# Patient Record
Sex: FEMALE | Race: BLACK OR AFRICAN AMERICAN | NOT HISPANIC OR LATINO | Employment: UNEMPLOYED | URBAN - METROPOLITAN AREA
[De-identification: names, ages, dates, MRNs, and addresses within clinical notes are randomized per-mention and may not be internally consistent; named-entity substitution may affect disease eponyms.]

---

## 2017-06-14 ENCOUNTER — HOSPITAL ENCOUNTER (INPATIENT)
Facility: HOSPITAL | Age: 37
LOS: 2 days | Discharge: HOME | DRG: 392 | End: 2017-06-16
Attending: EMERGENCY MEDICINE | Admitting: INTERNAL MEDICINE
Payer: COMMERCIAL

## 2017-06-14 ENCOUNTER — APPOINTMENT (EMERGENCY)
Dept: CT IMAGING | Facility: HOSPITAL | Age: 37
DRG: 392 | End: 2017-06-14
Payer: COMMERCIAL

## 2017-06-14 DIAGNOSIS — R69 DIAGNOSIS UNKNOWN: ICD-10-CM

## 2017-06-14 DIAGNOSIS — F11.90 CHRONIC, CONTINUOUS USE OF OPIOIDS: ICD-10-CM

## 2017-06-14 DIAGNOSIS — R10.31 ACUTE BILATERAL LOWER ABDOMINAL PAIN: ICD-10-CM

## 2017-06-14 DIAGNOSIS — F32.A ANXIETY AND DEPRESSION: ICD-10-CM

## 2017-06-14 DIAGNOSIS — R10.32 ACUTE BILATERAL LOWER ABDOMINAL PAIN: ICD-10-CM

## 2017-06-14 DIAGNOSIS — F41.9 ANXIETY AND DEPRESSION: ICD-10-CM

## 2017-06-14 DIAGNOSIS — N82.4 ENTEROVAGINAL FISTULA: ICD-10-CM

## 2017-06-14 DIAGNOSIS — R10.9 ABDOMINAL PAIN, ACUTE: Primary | ICD-10-CM

## 2017-06-14 PROBLEM — K50.90 CROHN'S DISEASE (HCC): Chronic | Status: ACTIVE | Noted: 2017-06-14

## 2017-06-14 LAB
ALBUMIN SERPL BCP-MCNC: 3.3 G/DL (ref 3.5–5)
ALP SERPL-CCNC: 91 U/L (ref 46–116)
ALT SERPL W P-5'-P-CCNC: 31 U/L (ref 12–78)
ANION GAP SERPL CALCULATED.3IONS-SCNC: 8 MMOL/L (ref 4–13)
AST SERPL W P-5'-P-CCNC: 23 U/L (ref 5–45)
BASOPHILS # BLD AUTO: 0.02 THOUSANDS/ΜL (ref 0–0.1)
BASOPHILS NFR BLD AUTO: 0 % (ref 0–1)
BILIRUB SERPL-MCNC: 0.2 MG/DL (ref 0.2–1)
BUN SERPL-MCNC: 12 MG/DL (ref 5–25)
CALCIUM SERPL-MCNC: 8.5 MG/DL (ref 8.3–10.1)
CHLORIDE SERPL-SCNC: 108 MMOL/L (ref 100–108)
CLARITY, POC: CLEAR
CO2 SERPL-SCNC: 27 MMOL/L (ref 21–32)
COLOR, POC: NORMAL
CREAT SERPL-MCNC: 0.81 MG/DL (ref 0.6–1.3)
EOSINOPHIL # BLD AUTO: 0.29 THOUSAND/ΜL (ref 0–0.61)
EOSINOPHIL NFR BLD AUTO: 4 % (ref 0–6)
ERYTHROCYTE [DISTWIDTH] IN BLOOD BY AUTOMATED COUNT: 15 % (ref 11.6–15.1)
EXT BILIRUBIN, UA: NEGATIVE
EXT BLOOD URINE: NEGATIVE
EXT GLUCOSE, UA: NEGATIVE
EXT KETONES: NEGATIVE
EXT NITRITE, UA: NEGATIVE
EXT PH, UA: 6
EXT PROTEIN, UA: NORMAL
EXT SPECIFIC GRAVITY, UA: 1.02
EXT UROBILINOGEN: 0.2
GFR SERPL CREATININE-BSD FRML MDRD: >60 ML/MIN/1.73SQ M
GLUCOSE SERPL-MCNC: 88 MG/DL (ref 65–140)
HCT VFR BLD AUTO: 33.9 % (ref 34.8–46.1)
HGB BLD-MCNC: 10.9 G/DL (ref 11.5–15.4)
LIPASE SERPL-CCNC: 217 U/L (ref 73–393)
LYMPHOCYTES # BLD AUTO: 2.45 THOUSANDS/ΜL (ref 0.6–4.47)
LYMPHOCYTES NFR BLD AUTO: 37 % (ref 14–44)
MCH RBC QN AUTO: 27.7 PG (ref 26.8–34.3)
MCHC RBC AUTO-ENTMCNC: 32.2 G/DL (ref 31.4–37.4)
MCV RBC AUTO: 86 FL (ref 82–98)
MONOCYTES # BLD AUTO: 0.55 THOUSAND/ΜL (ref 0.17–1.22)
MONOCYTES NFR BLD AUTO: 8 % (ref 4–12)
NEUTROPHILS # BLD AUTO: 3.41 THOUSANDS/ΜL (ref 1.85–7.62)
NEUTS SEG NFR BLD AUTO: 51 % (ref 43–75)
PLATELET # BLD AUTO: 409 THOUSANDS/UL (ref 149–390)
PMV BLD AUTO: 11.3 FL (ref 8.9–12.7)
POTASSIUM SERPL-SCNC: 3.8 MMOL/L (ref 3.5–5.3)
PROT SERPL-MCNC: 7.6 G/DL (ref 6.4–8.2)
RBC # BLD AUTO: 3.93 MILLION/UL (ref 3.81–5.12)
SODIUM SERPL-SCNC: 143 MMOL/L (ref 136–145)
WBC # BLD AUTO: 6.72 THOUSAND/UL (ref 4.31–10.16)
WBC # BLD EST: NEGATIVE 10*3/UL

## 2017-06-14 PROCEDURE — 36415 COLL VENOUS BLD VENIPUNCTURE: CPT | Performed by: EMERGENCY MEDICINE

## 2017-06-14 PROCEDURE — 81002 URINALYSIS NONAUTO W/O SCOPE: CPT | Performed by: EMERGENCY MEDICINE

## 2017-06-14 PROCEDURE — 96374 THER/PROPH/DIAG INJ IV PUSH: CPT

## 2017-06-14 PROCEDURE — 80053 COMPREHEN METABOLIC PANEL: CPT | Performed by: EMERGENCY MEDICINE

## 2017-06-14 PROCEDURE — 83690 ASSAY OF LIPASE: CPT | Performed by: EMERGENCY MEDICINE

## 2017-06-14 PROCEDURE — 99285 EMERGENCY DEPT VISIT HI MDM: CPT

## 2017-06-14 PROCEDURE — 74176 CT ABD & PELVIS W/O CONTRAST: CPT

## 2017-06-14 PROCEDURE — 96372 THER/PROPH/DIAG INJ SC/IM: CPT

## 2017-06-14 PROCEDURE — 85025 COMPLETE CBC W/AUTO DIFF WBC: CPT | Performed by: EMERGENCY MEDICINE

## 2017-06-14 RX ORDER — PANTOPRAZOLE SODIUM 40 MG/1
40 TABLET, DELAYED RELEASE ORAL DAILY
Status: DISCONTINUED | OUTPATIENT
Start: 2017-06-14 | End: 2017-06-16 | Stop reason: HOSPADM

## 2017-06-14 RX ORDER — PRAZOSIN HYDROCHLORIDE 1 MG/1
2 CAPSULE ORAL
Status: DISCONTINUED | OUTPATIENT
Start: 2017-06-14 | End: 2017-06-16 | Stop reason: HOSPADM

## 2017-06-14 RX ORDER — KETOROLAC TROMETHAMINE 30 MG/ML
15 INJECTION, SOLUTION INTRAMUSCULAR; INTRAVENOUS EVERY 6 HOURS PRN
Status: DISCONTINUED | OUTPATIENT
Start: 2017-06-14 | End: 2017-06-14

## 2017-06-14 RX ORDER — PRAZOSIN HYDROCHLORIDE 2 MG/1
2 CAPSULE ORAL
COMMUNITY
End: 2017-12-27

## 2017-06-14 RX ORDER — ONDANSETRON 2 MG/ML
4 INJECTION INTRAMUSCULAR; INTRAVENOUS EVERY 4 HOURS PRN
Status: DISCONTINUED | OUTPATIENT
Start: 2017-06-14 | End: 2017-06-16 | Stop reason: HOSPADM

## 2017-06-14 RX ORDER — MORPHINE SULFATE 15 MG/1
30 TABLET ORAL EVERY 6 HOURS PRN
Status: DISCONTINUED | OUTPATIENT
Start: 2017-06-14 | End: 2017-06-16 | Stop reason: HOSPADM

## 2017-06-14 RX ORDER — HYDROMORPHONE HCL 110MG/55ML
2 PATIENT CONTROLLED ANALGESIA SYRINGE INTRAVENOUS ONCE
Status: COMPLETED | OUTPATIENT
Start: 2017-06-14 | End: 2017-06-14

## 2017-06-14 RX ORDER — SODIUM CHLORIDE 9 MG/ML
125 INJECTION, SOLUTION INTRAVENOUS CONTINUOUS
Status: DISCONTINUED | OUTPATIENT
Start: 2017-06-14 | End: 2017-06-16

## 2017-06-14 RX ORDER — MORPHINE SULFATE 15 MG/1
30 TABLET ORAL EVERY 6 HOURS PRN
Status: DISCONTINUED | OUTPATIENT
Start: 2017-06-14 | End: 2017-06-14

## 2017-06-14 RX ORDER — ACETAMINOPHEN 325 MG/1
650 TABLET ORAL EVERY 4 HOURS PRN
Status: DISCONTINUED | OUTPATIENT
Start: 2017-06-14 | End: 2017-06-16 | Stop reason: HOSPADM

## 2017-06-14 RX ORDER — ONDANSETRON 4 MG/1
4 TABLET, ORALLY DISINTEGRATING ORAL ONCE
Status: COMPLETED | OUTPATIENT
Start: 2017-06-14 | End: 2017-06-14

## 2017-06-14 RX ORDER — BUTALBITAL, ACETAMINOPHEN AND CAFFEINE 50; 325; 40 MG/1; MG/1; MG/1
1 TABLET ORAL AS NEEDED
COMMUNITY

## 2017-06-14 RX ORDER — AMOXICILLIN 250 MG
2 CAPSULE ORAL 2 TIMES DAILY
Status: DISCONTINUED | OUTPATIENT
Start: 2017-06-14 | End: 2017-06-16 | Stop reason: HOSPADM

## 2017-06-14 RX ORDER — MORPHINE SULFATE 15 MG/1
15 TABLET ORAL EVERY 4 HOURS PRN
Status: DISCONTINUED | OUTPATIENT
Start: 2017-06-14 | End: 2017-06-16 | Stop reason: HOSPADM

## 2017-06-14 RX ORDER — ESCITALOPRAM OXALATE 20 MG/1
20 TABLET ORAL DAILY
Status: DISCONTINUED | OUTPATIENT
Start: 2017-06-14 | End: 2017-06-16 | Stop reason: HOSPADM

## 2017-06-14 RX ORDER — ONDANSETRON HYDROCHLORIDE 4 MG/5ML
4 SOLUTION ORAL ONCE
Status: COMPLETED | OUTPATIENT
Start: 2017-06-14 | End: 2017-06-14

## 2017-06-14 RX ORDER — DEXTROSE AND SODIUM CHLORIDE 5; .9 G/100ML; G/100ML
75 INJECTION, SOLUTION INTRAVENOUS CONTINUOUS
Status: DISCONTINUED | OUTPATIENT
Start: 2017-06-14 | End: 2017-06-16

## 2017-06-14 RX ORDER — POLYETHYLENE GLYCOL 3350 17 G/17G
17 POWDER, FOR SOLUTION ORAL DAILY
Status: DISCONTINUED | OUTPATIENT
Start: 2017-06-14 | End: 2017-06-16 | Stop reason: HOSPADM

## 2017-06-14 RX ORDER — MAGNESIUM HYDROXIDE/ALUMINUM HYDROXICE/SIMETHICONE 120; 1200; 1200 MG/30ML; MG/30ML; MG/30ML
30 SUSPENSION ORAL EVERY 6 HOURS PRN
Status: DISCONTINUED | OUTPATIENT
Start: 2017-06-14 | End: 2017-06-16 | Stop reason: HOSPADM

## 2017-06-14 RX ORDER — BUSPIRONE HYDROCHLORIDE 5 MG/1
10 TABLET ORAL 3 TIMES DAILY
Status: DISCONTINUED | OUTPATIENT
Start: 2017-06-14 | End: 2017-06-16 | Stop reason: HOSPADM

## 2017-06-14 RX ORDER — QUETIAPINE FUMARATE 100 MG/1
100 TABLET, FILM COATED ORAL
COMMUNITY
End: 2017-12-27

## 2017-06-14 RX ORDER — QUETIAPINE FUMARATE 100 MG/1
100 TABLET, FILM COATED ORAL
Status: DISCONTINUED | OUTPATIENT
Start: 2017-06-14 | End: 2017-06-16 | Stop reason: HOSPADM

## 2017-06-14 RX ORDER — ESCITALOPRAM OXALATE 20 MG/1
20 TABLET ORAL DAILY
COMMUNITY
End: 2017-10-13 | Stop reason: HOSPADM

## 2017-06-14 RX ORDER — BUSPIRONE HYDROCHLORIDE 10 MG/1
10 TABLET ORAL 3 TIMES DAILY
COMMUNITY
End: 2017-10-13 | Stop reason: HOSPADM

## 2017-06-14 RX ORDER — SERTRALINE HYDROCHLORIDE 100 MG/1
100 TABLET, FILM COATED ORAL DAILY
Status: DISCONTINUED | OUTPATIENT
Start: 2017-06-14 | End: 2017-06-14

## 2017-06-14 RX ORDER — PREGABALIN 75 MG/1
150 CAPSULE ORAL 2 TIMES DAILY
Status: DISCONTINUED | OUTPATIENT
Start: 2017-06-14 | End: 2017-06-16 | Stop reason: HOSPADM

## 2017-06-14 RX ORDER — AMLODIPINE BESYLATE 5 MG/1
5 TABLET ORAL DAILY
Status: DISCONTINUED | OUTPATIENT
Start: 2017-06-14 | End: 2017-06-16 | Stop reason: HOSPADM

## 2017-06-14 RX ORDER — SERTRALINE HYDROCHLORIDE 100 MG/1
100 TABLET, FILM COATED ORAL DAILY
COMMUNITY
End: 2017-10-13 | Stop reason: HOSPADM

## 2017-06-14 RX ORDER — AMLODIPINE BESYLATE 5 MG/1
10 TABLET ORAL DAILY
COMMUNITY

## 2017-06-14 RX ORDER — TRAZODONE HYDROCHLORIDE 150 MG/1
150 TABLET ORAL
COMMUNITY
End: 2017-12-27

## 2017-06-14 RX ORDER — MORPHINE SULFATE 30 MG/1
30 TABLET ORAL EVERY 6 HOURS PRN
COMMUNITY
End: 2017-10-13 | Stop reason: HOSPADM

## 2017-06-14 RX ORDER — DICYCLOMINE HCL 20 MG
20 TABLET ORAL AS NEEDED
COMMUNITY

## 2017-06-14 RX ORDER — DIPHENHYDRAMINE HYDROCHLORIDE 50 MG/ML
50 INJECTION INTRAMUSCULAR; INTRAVENOUS ONCE
Status: COMPLETED | OUTPATIENT
Start: 2017-06-14 | End: 2017-06-14

## 2017-06-14 RX ORDER — DICYCLOMINE HCL 20 MG
20 TABLET ORAL EVERY 6 HOURS
Status: DISCONTINUED | OUTPATIENT
Start: 2017-06-14 | End: 2017-06-16 | Stop reason: HOSPADM

## 2017-06-14 RX ORDER — TRAMADOL HYDROCHLORIDE 50 MG/1
100 TABLET ORAL EVERY 6 HOURS PRN
Status: DISCONTINUED | OUTPATIENT
Start: 2017-06-14 | End: 2017-06-14

## 2017-06-14 RX ADMIN — ONDANSETRON 4 MG: 4 TABLET, ORALLY DISINTEGRATING ORAL at 06:38

## 2017-06-14 RX ADMIN — HYDROMORPHONE HYDROCHLORIDE 1 MG: 1 INJECTION, SOLUTION INTRAMUSCULAR; INTRAVENOUS; SUBCUTANEOUS at 22:15

## 2017-06-14 RX ADMIN — MORPHINE SULFATE 30 MG: 15 TABLET ORAL at 18:33

## 2017-06-14 RX ADMIN — BUSPIRONE HYDROCHLORIDE 10 MG: 5 TABLET ORAL at 21:26

## 2017-06-14 RX ADMIN — HYDROMORPHONE HYDROCHLORIDE 2 MG: 2 INJECTION, SOLUTION INTRAMUSCULAR; INTRAVENOUS; SUBCUTANEOUS at 06:38

## 2017-06-14 RX ADMIN — DICYCLOMINE HYDROCHLORIDE 20 MG: 20 TABLET ORAL at 21:26

## 2017-06-14 RX ADMIN — POLYETHYLENE GLYCOL 3350 17 G: 17 POWDER, FOR SOLUTION ORAL at 16:03

## 2017-06-14 RX ADMIN — PANTOPRAZOLE SODIUM 40 MG: 40 TABLET, DELAYED RELEASE ORAL at 16:03

## 2017-06-14 RX ADMIN — MORPHINE SULFATE 30 MG: 15 TABLET ORAL at 11:07

## 2017-06-14 RX ADMIN — HYDROMORPHONE HYDROCHLORIDE 1 MG: 1 INJECTION, SOLUTION INTRAMUSCULAR; INTRAVENOUS; SUBCUTANEOUS at 04:46

## 2017-06-14 RX ADMIN — ONDANSETRON 4 MG: 2 INJECTION INTRAMUSCULAR; INTRAVENOUS at 21:26

## 2017-06-14 RX ADMIN — DICYCLOMINE HYDROCHLORIDE 20 MG: 20 TABLET ORAL at 17:28

## 2017-06-14 RX ADMIN — IOHEXOL 50 ML: 240 INJECTION, SOLUTION INTRATHECAL; INTRAVASCULAR; INTRAVENOUS; ORAL at 04:45

## 2017-06-14 RX ADMIN — AMLODIPINE BESYLATE 5 MG: 5 TABLET ORAL at 16:03

## 2017-06-14 RX ADMIN — BUSPIRONE HYDROCHLORIDE 10 MG: 5 TABLET ORAL at 16:03

## 2017-06-14 RX ADMIN — MORPHINE SULFATE 15 MG: 15 TABLET ORAL at 19:25

## 2017-06-14 RX ADMIN — HYDROMORPHONE HYDROCHLORIDE 1 MG: 1 INJECTION, SOLUTION INTRAMUSCULAR; INTRAVENOUS; SUBCUTANEOUS at 16:03

## 2017-06-14 RX ADMIN — DEXTROSE AND SODIUM CHLORIDE 75 ML/HR: 5; .9 INJECTION, SOLUTION INTRAVENOUS at 17:24

## 2017-06-14 RX ADMIN — ONDANSETRON 4 MG: 4 SOLUTION ORAL at 07:41

## 2017-06-14 RX ADMIN — DIPHENHYDRAMINE HYDROCHLORIDE 50 MG: 50 INJECTION, SOLUTION INTRAMUSCULAR; INTRAVENOUS at 04:54

## 2017-06-14 RX ADMIN — ESCITALOPRAM OXALATE 20 MG: 20 TABLET ORAL at 18:33

## 2017-06-14 RX ADMIN — PREGABALIN 150 MG: 75 CAPSULE ORAL at 17:24

## 2017-06-14 RX ADMIN — PRAZOSIN HYDROCHLORIDE 2 MG: 1 CAPSULE ORAL at 21:28

## 2017-06-14 RX ADMIN — Medication 2 TABLET: at 17:24

## 2017-06-14 RX ADMIN — QUETIAPINE FUMARATE 100 MG: 100 TABLET, FILM COATED ORAL at 21:26

## 2017-06-15 ENCOUNTER — ANESTHESIA EVENT (INPATIENT)
Dept: GASTROENTEROLOGY | Facility: HOSPITAL | Age: 37
DRG: 392 | End: 2017-06-15
Payer: COMMERCIAL

## 2017-06-15 LAB
ALBUMIN SERPL BCP-MCNC: 3 G/DL (ref 3.5–5)
ALP SERPL-CCNC: 82 U/L (ref 46–116)
ALT SERPL W P-5'-P-CCNC: 31 U/L (ref 12–78)
ANION GAP SERPL CALCULATED.3IONS-SCNC: 6 MMOL/L (ref 4–13)
AST SERPL W P-5'-P-CCNC: 19 U/L (ref 5–45)
BILIRUB SERPL-MCNC: 0.2 MG/DL (ref 0.2–1)
BILIRUB UR QL STRIP: NEGATIVE
BUN SERPL-MCNC: 7 MG/DL (ref 5–25)
CALCIUM SERPL-MCNC: 7.7 MG/DL (ref 8.3–10.1)
CHLORIDE SERPL-SCNC: 104 MMOL/L (ref 100–108)
CLARITY UR: CLEAR
CO2 SERPL-SCNC: 29 MMOL/L (ref 21–32)
COLOR UR: NORMAL
CREAT SERPL-MCNC: 0.69 MG/DL (ref 0.6–1.3)
ERYTHROCYTE [DISTWIDTH] IN BLOOD BY AUTOMATED COUNT: 15.1 % (ref 11.6–15.1)
FERRITIN SERPL-MCNC: 8 NG/ML (ref 8–388)
GFR SERPL CREATININE-BSD FRML MDRD: >60 ML/MIN/1.73SQ M
GLUCOSE SERPL-MCNC: 106 MG/DL (ref 65–140)
GLUCOSE UR STRIP-MCNC: NEGATIVE MG/DL
HCT VFR BLD AUTO: 33 % (ref 34.8–46.1)
HEMOCCULT STL QL: NEGATIVE
HGB BLD-MCNC: 10.3 G/DL (ref 11.5–15.4)
HGB UR QL STRIP.AUTO: NEGATIVE
IRON SATN MFR SERPL: 10 %
IRON SERPL-MCNC: 31 UG/DL (ref 50–170)
KETONES UR STRIP-MCNC: NEGATIVE MG/DL
LEUKOCYTE ESTERASE UR QL STRIP: NEGATIVE
MAGNESIUM SERPL-MCNC: 1.9 MG/DL (ref 1.6–2.6)
MCH RBC QN AUTO: 27.2 PG (ref 26.8–34.3)
MCHC RBC AUTO-ENTMCNC: 31.2 G/DL (ref 31.4–37.4)
MCV RBC AUTO: 87 FL (ref 82–98)
NITRITE UR QL STRIP: NEGATIVE
PH UR STRIP.AUTO: 5.5 [PH] (ref 4.5–8)
PLATELET # BLD AUTO: 359 THOUSANDS/UL (ref 149–390)
PMV BLD AUTO: 9.9 FL (ref 8.9–12.7)
POTASSIUM SERPL-SCNC: 3.5 MMOL/L (ref 3.5–5.3)
PROT SERPL-MCNC: 7 G/DL (ref 6.4–8.2)
PROT UR STRIP-MCNC: NEGATIVE MG/DL
RBC # BLD AUTO: 3.79 MILLION/UL (ref 3.81–5.12)
SODIUM SERPL-SCNC: 139 MMOL/L (ref 136–145)
SP GR UR STRIP.AUTO: 1.01 (ref 1–1.03)
TIBC SERPL-MCNC: 324 UG/DL (ref 250–450)
UROBILINOGEN UR QL STRIP.AUTO: 0.2 E.U./DL
WBC # BLD AUTO: 5.03 THOUSAND/UL (ref 4.31–10.16)

## 2017-06-15 PROCEDURE — 82728 ASSAY OF FERRITIN: CPT | Performed by: PHYSICIAN ASSISTANT

## 2017-06-15 PROCEDURE — 87046 STOOL CULTR AEROBIC BACT EA: CPT | Performed by: PHYSICIAN ASSISTANT

## 2017-06-15 PROCEDURE — 80053 COMPREHEN METABOLIC PANEL: CPT | Performed by: PHYSICIAN ASSISTANT

## 2017-06-15 PROCEDURE — 83540 ASSAY OF IRON: CPT | Performed by: PHYSICIAN ASSISTANT

## 2017-06-15 PROCEDURE — 89055 LEUKOCYTE ASSESSMENT FECAL: CPT | Performed by: PHYSICIAN ASSISTANT

## 2017-06-15 PROCEDURE — 87045 FECES CULTURE AEROBIC BACT: CPT | Performed by: PHYSICIAN ASSISTANT

## 2017-06-15 PROCEDURE — 87015 SPECIMEN INFECT AGNT CONCNTJ: CPT | Performed by: PHYSICIAN ASSISTANT

## 2017-06-15 PROCEDURE — 83735 ASSAY OF MAGNESIUM: CPT | Performed by: PHYSICIAN ASSISTANT

## 2017-06-15 PROCEDURE — 85027 COMPLETE CBC AUTOMATED: CPT | Performed by: PHYSICIAN ASSISTANT

## 2017-06-15 PROCEDURE — 81003 URINALYSIS AUTO W/O SCOPE: CPT | Performed by: PHYSICIAN ASSISTANT

## 2017-06-15 PROCEDURE — 82272 OCCULT BLD FECES 1-3 TESTS: CPT | Performed by: PHYSICIAN ASSISTANT

## 2017-06-15 PROCEDURE — 83550 IRON BINDING TEST: CPT | Performed by: PHYSICIAN ASSISTANT

## 2017-06-15 PROCEDURE — 87899 AGENT NOS ASSAY W/OPTIC: CPT | Performed by: PHYSICIAN ASSISTANT

## 2017-06-15 RX ORDER — MAGNESIUM CARB/ALUMINUM HYDROX 105-160MG
296 TABLET,CHEWABLE ORAL ONCE
Status: COMPLETED | OUTPATIENT
Start: 2017-06-15 | End: 2017-06-15

## 2017-06-15 RX ADMIN — Medication 2 TABLET: at 17:39

## 2017-06-15 RX ADMIN — BUSPIRONE HYDROCHLORIDE 10 MG: 5 TABLET ORAL at 08:23

## 2017-06-15 RX ADMIN — ESCITALOPRAM OXALATE 20 MG: 20 TABLET ORAL at 08:23

## 2017-06-15 RX ADMIN — HYDROMORPHONE HYDROCHLORIDE 1 MG: 1 INJECTION, SOLUTION INTRAMUSCULAR; INTRAVENOUS; SUBCUTANEOUS at 04:21

## 2017-06-15 RX ADMIN — PREGABALIN 150 MG: 75 CAPSULE ORAL at 08:23

## 2017-06-15 RX ADMIN — HYDROMORPHONE HYDROCHLORIDE 1 MG: 1 INJECTION, SOLUTION INTRAMUSCULAR; INTRAVENOUS; SUBCUTANEOUS at 11:26

## 2017-06-15 RX ADMIN — QUETIAPINE FUMARATE 100 MG: 100 TABLET, FILM COATED ORAL at 21:02

## 2017-06-15 RX ADMIN — DICYCLOMINE HYDROCHLORIDE 20 MG: 20 TABLET ORAL at 21:03

## 2017-06-15 RX ADMIN — ONDANSETRON 4 MG: 2 INJECTION INTRAMUSCULAR; INTRAVENOUS at 11:25

## 2017-06-15 RX ADMIN — MORPHINE SULFATE 30 MG: 15 TABLET ORAL at 08:23

## 2017-06-15 RX ADMIN — DEXTROSE AND SODIUM CHLORIDE 75 ML/HR: 5; .9 INJECTION, SOLUTION INTRAVENOUS at 08:33

## 2017-06-15 RX ADMIN — MORPHINE SULFATE 15 MG: 15 TABLET ORAL at 15:54

## 2017-06-15 RX ADMIN — HYDROMORPHONE HYDROCHLORIDE 1 MG: 1 INJECTION, SOLUTION INTRAMUSCULAR; INTRAVENOUS; SUBCUTANEOUS at 17:42

## 2017-06-15 RX ADMIN — Medication 2 TABLET: at 08:23

## 2017-06-15 RX ADMIN — PANTOPRAZOLE SODIUM 40 MG: 40 TABLET, DELAYED RELEASE ORAL at 08:24

## 2017-06-15 RX ADMIN — PRAZOSIN HYDROCHLORIDE 2 MG: 1 CAPSULE ORAL at 21:03

## 2017-06-15 RX ADMIN — BUSPIRONE HYDROCHLORIDE 10 MG: 5 TABLET ORAL at 21:02

## 2017-06-15 RX ADMIN — MORPHINE SULFATE 30 MG: 15 TABLET ORAL at 21:02

## 2017-06-15 RX ADMIN — MORPHINE SULFATE 30 MG: 15 TABLET ORAL at 00:35

## 2017-06-15 RX ADMIN — BUSPIRONE HYDROCHLORIDE 10 MG: 5 TABLET ORAL at 15:54

## 2017-06-15 RX ADMIN — DEXTROSE AND SODIUM CHLORIDE 75 ML/HR: 5; .9 INJECTION, SOLUTION INTRAVENOUS at 21:16

## 2017-06-15 RX ADMIN — POLYETHYLENE GLYCOL 3350, SODIUM SULFATE ANHYDROUS, SODIUM BICARBONATE, SODIUM CHLORIDE, POTASSIUM CHLORIDE 1000 ML: 236; 22.74; 6.74; 5.86; 2.97 POWDER, FOR SOLUTION ORAL at 15:54

## 2017-06-15 RX ADMIN — DICYCLOMINE HYDROCHLORIDE 20 MG: 20 TABLET ORAL at 15:54

## 2017-06-15 RX ADMIN — MAGESIUM CITRATE 296 ML: 1.75 LIQUID ORAL at 08:25

## 2017-06-15 RX ADMIN — PREGABALIN 150 MG: 75 CAPSULE ORAL at 17:39

## 2017-06-15 RX ADMIN — DICYCLOMINE HYDROCHLORIDE 20 MG: 20 TABLET ORAL at 08:30

## 2017-06-15 RX ADMIN — AMLODIPINE BESYLATE 5 MG: 5 TABLET ORAL at 08:23

## 2017-06-15 RX ADMIN — DICYCLOMINE HYDROCHLORIDE 20 MG: 20 TABLET ORAL at 03:41

## 2017-06-16 ENCOUNTER — ANESTHESIA (INPATIENT)
Dept: GASTROENTEROLOGY | Facility: HOSPITAL | Age: 37
DRG: 392 | End: 2017-06-16
Payer: COMMERCIAL

## 2017-06-16 VITALS
TEMPERATURE: 98.1 F | DIASTOLIC BLOOD PRESSURE: 72 MMHG | SYSTOLIC BLOOD PRESSURE: 111 MMHG | RESPIRATION RATE: 18 BRPM | HEART RATE: 78 BPM | WEIGHT: 243.17 LBS | OXYGEN SATURATION: 100 % | HEIGHT: 70 IN | BODY MASS INDEX: 34.81 KG/M2

## 2017-06-16 LAB
ANION GAP SERPL CALCULATED.3IONS-SCNC: 5 MMOL/L (ref 4–13)
BUN SERPL-MCNC: 5 MG/DL (ref 5–25)
CALCIUM SERPL-MCNC: 8 MG/DL (ref 8.3–10.1)
CHLORIDE SERPL-SCNC: 106 MMOL/L (ref 100–108)
CO2 SERPL-SCNC: 29 MMOL/L (ref 21–32)
CREAT SERPL-MCNC: 0.65 MG/DL (ref 0.6–1.3)
ERYTHROCYTE [DISTWIDTH] IN BLOOD BY AUTOMATED COUNT: 15.1 % (ref 11.6–15.1)
GFR SERPL CREATININE-BSD FRML MDRD: >60 ML/MIN/1.73SQ M
GLUCOSE SERPL-MCNC: 116 MG/DL (ref 65–140)
HCT VFR BLD AUTO: 31.6 % (ref 34.8–46.1)
HGB BLD-MCNC: 9.8 G/DL (ref 11.5–15.4)
MCH RBC QN AUTO: 27.3 PG (ref 26.8–34.3)
MCHC RBC AUTO-ENTMCNC: 31 G/DL (ref 31.4–37.4)
MCV RBC AUTO: 88 FL (ref 82–98)
PLATELET # BLD AUTO: 347 THOUSANDS/UL (ref 149–390)
PMV BLD AUTO: 10.2 FL (ref 8.9–12.7)
POTASSIUM SERPL-SCNC: 3.3 MMOL/L (ref 3.5–5.3)
RBC # BLD AUTO: 3.59 MILLION/UL (ref 3.81–5.12)
SODIUM SERPL-SCNC: 140 MMOL/L (ref 136–145)
WBC # BLD AUTO: 5.06 THOUSAND/UL (ref 4.31–10.16)
WBC SPEC QL GRAM STN: NORMAL

## 2017-06-16 PROCEDURE — 85027 COMPLETE CBC AUTOMATED: CPT | Performed by: PHYSICIAN ASSISTANT

## 2017-06-16 PROCEDURE — 88305 TISSUE EXAM BY PATHOLOGIST: CPT | Performed by: SURGERY

## 2017-06-16 PROCEDURE — 0DBB8ZX EXCISION OF ILEUM, VIA NATURAL OR ARTIFICIAL OPENING ENDOSCOPIC, DIAGNOSTIC: ICD-10-PCS | Performed by: SURGERY

## 2017-06-16 PROCEDURE — 80048 BASIC METABOLIC PNL TOTAL CA: CPT | Performed by: PHYSICIAN ASSISTANT

## 2017-06-16 RX ORDER — PROPOFOL 10 MG/ML
INJECTION, EMULSION INTRAVENOUS AS NEEDED
Status: DISCONTINUED | OUTPATIENT
Start: 2017-06-16 | End: 2017-06-16 | Stop reason: SURG

## 2017-06-16 RX ORDER — PROPOFOL 10 MG/ML
INJECTION, EMULSION INTRAVENOUS CONTINUOUS PRN
Status: DISCONTINUED | OUTPATIENT
Start: 2017-06-16 | End: 2017-06-16 | Stop reason: SURG

## 2017-06-16 RX ORDER — SODIUM CHLORIDE, SODIUM LACTATE, POTASSIUM CHLORIDE, CALCIUM CHLORIDE 600; 310; 30; 20 MG/100ML; MG/100ML; MG/100ML; MG/100ML
INJECTION, SOLUTION INTRAVENOUS CONTINUOUS PRN
Status: DISCONTINUED | OUTPATIENT
Start: 2017-06-16 | End: 2017-06-16 | Stop reason: SURG

## 2017-06-16 RX ADMIN — PANTOPRAZOLE SODIUM 40 MG: 40 TABLET, DELAYED RELEASE ORAL at 09:18

## 2017-06-16 RX ADMIN — MORPHINE SULFATE 30 MG: 15 TABLET ORAL at 10:30

## 2017-06-16 RX ADMIN — MORPHINE SULFATE 15 MG: 15 TABLET ORAL at 12:32

## 2017-06-16 RX ADMIN — MORPHINE SULFATE 30 MG: 15 TABLET ORAL at 03:55

## 2017-06-16 RX ADMIN — SODIUM CHLORIDE, SODIUM LACTATE, POTASSIUM CHLORIDE, AND CALCIUM CHLORIDE: .6; .31; .03; .02 INJECTION, SOLUTION INTRAVENOUS at 07:53

## 2017-06-16 RX ADMIN — HYDROMORPHONE HYDROCHLORIDE 1 MG: 1 INJECTION, SOLUTION INTRAMUSCULAR; INTRAVENOUS; SUBCUTANEOUS at 02:08

## 2017-06-16 RX ADMIN — HYDROMORPHONE HYDROCHLORIDE 1 MG: 1 INJECTION, SOLUTION INTRAMUSCULAR; INTRAVENOUS; SUBCUTANEOUS at 09:18

## 2017-06-16 RX ADMIN — DICYCLOMINE HYDROCHLORIDE 20 MG: 20 TABLET ORAL at 09:19

## 2017-06-16 RX ADMIN — PREGABALIN 150 MG: 75 CAPSULE ORAL at 09:18

## 2017-06-16 RX ADMIN — DICYCLOMINE HYDROCHLORIDE 20 MG: 20 TABLET ORAL at 03:52

## 2017-06-16 RX ADMIN — ONDANSETRON 4 MG: 2 INJECTION INTRAMUSCULAR; INTRAVENOUS at 02:08

## 2017-06-16 RX ADMIN — BUSPIRONE HYDROCHLORIDE 10 MG: 5 TABLET ORAL at 09:18

## 2017-06-16 RX ADMIN — PROPOFOL 120 MCG/KG/MIN: 10 INJECTION, EMULSION INTRAVENOUS at 07:55

## 2017-06-16 RX ADMIN — AMLODIPINE BESYLATE 5 MG: 5 TABLET ORAL at 09:18

## 2017-06-16 RX ADMIN — PROPOFOL 60 MG: 10 INJECTION, EMULSION INTRAVENOUS at 07:55

## 2017-06-16 RX ADMIN — ESCITALOPRAM OXALATE 20 MG: 20 TABLET ORAL at 09:18

## 2017-06-16 RX ADMIN — ONDANSETRON 4 MG: 2 INJECTION INTRAMUSCULAR; INTRAVENOUS at 09:18

## 2017-06-17 LAB
BACTERIA STL CULT: NORMAL
BACTERIA STL CULT: NORMAL

## 2017-07-28 ENCOUNTER — APPOINTMENT (EMERGENCY)
Dept: CT IMAGING | Facility: HOSPITAL | Age: 37
End: 2017-07-28
Payer: COMMERCIAL

## 2017-07-28 ENCOUNTER — APPOINTMENT (EMERGENCY)
Dept: RADIOLOGY | Facility: HOSPITAL | Age: 37
End: 2017-07-28
Payer: COMMERCIAL

## 2017-07-28 ENCOUNTER — HOSPITAL ENCOUNTER (EMERGENCY)
Facility: HOSPITAL | Age: 37
Discharge: HOME | End: 2017-07-28
Attending: EMERGENCY MEDICINE | Admitting: EMERGENCY MEDICINE
Payer: COMMERCIAL

## 2017-07-28 VITALS
OXYGEN SATURATION: 100 % | TEMPERATURE: 99.1 F | SYSTOLIC BLOOD PRESSURE: 153 MMHG | RESPIRATION RATE: 20 BRPM | BODY MASS INDEX: 35.27 KG/M2 | HEART RATE: 93 BPM | WEIGHT: 245.8 LBS | DIASTOLIC BLOOD PRESSURE: 102 MMHG

## 2017-07-28 DIAGNOSIS — R10.9 ABDOMINAL PAIN: Primary | ICD-10-CM

## 2017-07-28 DIAGNOSIS — R11.0 NAUSEA: ICD-10-CM

## 2017-07-28 LAB
ALBUMIN SERPL BCP-MCNC: 3.9 G/DL (ref 3.5–5)
ALP SERPL-CCNC: 76 U/L (ref 46–116)
ALT SERPL W P-5'-P-CCNC: 15 U/L (ref 12–78)
ANION GAP SERPL CALCULATED.3IONS-SCNC: 10 MMOL/L (ref 4–13)
AST SERPL W P-5'-P-CCNC: 10 U/L (ref 5–45)
BASOPHILS # BLD AUTO: 0.01 THOUSANDS/ΜL (ref 0–0.1)
BASOPHILS NFR BLD AUTO: 0 % (ref 0–1)
BILIRUB SERPL-MCNC: 0.2 MG/DL (ref 0.2–1)
BUN SERPL-MCNC: 22 MG/DL (ref 5–25)
CALCIUM SERPL-MCNC: 8.9 MG/DL (ref 8.3–10.1)
CHLORIDE SERPL-SCNC: 105 MMOL/L (ref 100–108)
CLARITY, POC: CLEAR
CO2 SERPL-SCNC: 28 MMOL/L (ref 21–32)
COLOR, POC: YELLOW
CREAT SERPL-MCNC: 1.03 MG/DL (ref 0.6–1.3)
EOSINOPHIL # BLD AUTO: 0.05 THOUSAND/ΜL (ref 0–0.61)
EOSINOPHIL NFR BLD AUTO: 1 % (ref 0–6)
ERYTHROCYTE [DISTWIDTH] IN BLOOD BY AUTOMATED COUNT: 16.2 % (ref 11.6–15.1)
EXT BILIRUBIN, UA: NEGATIVE
EXT BLOOD URINE: NEGATIVE
EXT GLUCOSE, UA: NEGATIVE
EXT KETONES: NEGATIVE
EXT NITRITE, UA: NEGATIVE
EXT PH, UA: 6
EXT PROTEIN, UA: NORMAL
EXT SPECIFIC GRAVITY, UA: 1.02
EXT UROBILINOGEN: NEGATIVE
GFR SERPL CREATININE-BSD FRML MDRD: 81 ML/MIN/1.73SQ M
GLUCOSE SERPL-MCNC: 83 MG/DL (ref 65–140)
HCT VFR BLD AUTO: 35.4 % (ref 34.8–46.1)
HGB BLD-MCNC: 11.3 G/DL (ref 11.5–15.4)
LACTATE SERPL-SCNC: 0.5 MMOL/L (ref 0.5–2)
LIPASE SERPL-CCNC: 187 U/L (ref 73–393)
LYMPHOCYTES # BLD AUTO: 2.21 THOUSANDS/ΜL (ref 0.6–4.47)
LYMPHOCYTES NFR BLD AUTO: 42 % (ref 14–44)
MCH RBC QN AUTO: 27.8 PG (ref 26.8–34.3)
MCHC RBC AUTO-ENTMCNC: 31.9 G/DL (ref 31.4–37.4)
MCV RBC AUTO: 87 FL (ref 82–98)
MONOCYTES # BLD AUTO: 0.32 THOUSAND/ΜL (ref 0.17–1.22)
MONOCYTES NFR BLD AUTO: 6 % (ref 4–12)
NEUTROPHILS # BLD AUTO: 2.62 THOUSANDS/ΜL (ref 1.85–7.62)
NEUTS SEG NFR BLD AUTO: 51 % (ref 43–75)
PLATELET # BLD AUTO: 424 THOUSANDS/UL (ref 149–390)
PMV BLD AUTO: 9.8 FL (ref 8.9–12.7)
POTASSIUM SERPL-SCNC: 4.1 MMOL/L (ref 3.5–5.3)
PROT SERPL-MCNC: 8.5 G/DL (ref 6.4–8.2)
RBC # BLD AUTO: 4.07 MILLION/UL (ref 3.81–5.12)
SODIUM SERPL-SCNC: 143 MMOL/L (ref 136–145)
WBC # BLD AUTO: 5.21 THOUSAND/UL (ref 4.31–10.16)
WBC # BLD EST: NEGATIVE 10*3/UL

## 2017-07-28 PROCEDURE — 74177 CT ABD & PELVIS W/CONTRAST: CPT

## 2017-07-28 PROCEDURE — 96376 TX/PRO/DX INJ SAME DRUG ADON: CPT

## 2017-07-28 PROCEDURE — 96375 TX/PRO/DX INJ NEW DRUG ADDON: CPT

## 2017-07-28 PROCEDURE — 73564 X-RAY EXAM KNEE 4 OR MORE: CPT

## 2017-07-28 PROCEDURE — 83605 ASSAY OF LACTIC ACID: CPT | Performed by: PHYSICIAN ASSISTANT

## 2017-07-28 PROCEDURE — 73110 X-RAY EXAM OF WRIST: CPT

## 2017-07-28 PROCEDURE — 81002 URINALYSIS NONAUTO W/O SCOPE: CPT | Performed by: PHYSICIAN ASSISTANT

## 2017-07-28 PROCEDURE — 96361 HYDRATE IV INFUSION ADD-ON: CPT

## 2017-07-28 PROCEDURE — 85025 COMPLETE CBC W/AUTO DIFF WBC: CPT | Performed by: PHYSICIAN ASSISTANT

## 2017-07-28 PROCEDURE — 99285 EMERGENCY DEPT VISIT HI MDM: CPT

## 2017-07-28 PROCEDURE — 80053 COMPREHEN METABOLIC PANEL: CPT | Performed by: PHYSICIAN ASSISTANT

## 2017-07-28 PROCEDURE — 83690 ASSAY OF LIPASE: CPT | Performed by: PHYSICIAN ASSISTANT

## 2017-07-28 PROCEDURE — 36415 COLL VENOUS BLD VENIPUNCTURE: CPT | Performed by: PHYSICIAN ASSISTANT

## 2017-07-28 PROCEDURE — 96374 THER/PROPH/DIAG INJ IV PUSH: CPT

## 2017-07-28 PROCEDURE — 93005 ELECTROCARDIOGRAM TRACING: CPT | Performed by: PHYSICIAN ASSISTANT

## 2017-07-28 RX ORDER — ONDANSETRON 2 MG/ML
4 INJECTION INTRAMUSCULAR; INTRAVENOUS ONCE
Status: COMPLETED | OUTPATIENT
Start: 2017-07-28 | End: 2017-07-28

## 2017-07-28 RX ORDER — ONDANSETRON 4 MG/1
4 TABLET, ORALLY DISINTEGRATING ORAL EVERY 6 HOURS PRN
Qty: 20 TABLET | Refills: 0 | Status: SHIPPED | OUTPATIENT
Start: 2017-07-28 | End: 2017-12-27

## 2017-07-28 RX ADMIN — SODIUM CHLORIDE 1000 ML: 0.9 INJECTION, SOLUTION INTRAVENOUS at 17:53

## 2017-07-28 RX ADMIN — IOHEXOL 100 ML: 350 INJECTION, SOLUTION INTRAVENOUS at 18:45

## 2017-07-28 RX ADMIN — HYDROMORPHONE HYDROCHLORIDE 1 MG: 1 INJECTION, SOLUTION INTRAMUSCULAR; INTRAVENOUS; SUBCUTANEOUS at 19:27

## 2017-07-28 RX ADMIN — ONDANSETRON 4 MG: 2 INJECTION INTRAMUSCULAR; INTRAVENOUS at 18:19

## 2017-07-28 RX ADMIN — HYDROMORPHONE HYDROCHLORIDE 1 MG: 1 INJECTION, SOLUTION INTRAMUSCULAR; INTRAVENOUS; SUBCUTANEOUS at 18:30

## 2017-07-31 LAB
ATRIAL RATE: 77 BPM
P AXIS: 58 DEGREES
PR INTERVAL: 160 MS
QRS AXIS: 17 DEGREES
QRSD INTERVAL: 76 MS
QT INTERVAL: 366 MS
QTC INTERVAL: 406 MS
T WAVE AXIS: 48 DEGREES
VENTRICULAR RATE: 74 BPM

## 2017-10-13 ENCOUNTER — HOSPITAL ENCOUNTER (EMERGENCY)
Facility: HOSPITAL | Age: 37
Discharge: HOME | End: 2017-10-13
Attending: EMERGENCY MEDICINE | Admitting: EMERGENCY MEDICINE
Payer: COMMERCIAL

## 2017-10-13 ENCOUNTER — APPOINTMENT (EMERGENCY)
Dept: RADIOLOGY | Facility: HOSPITAL | Age: 37
End: 2017-10-13
Payer: COMMERCIAL

## 2017-10-13 VITALS
DIASTOLIC BLOOD PRESSURE: 84 MMHG | HEART RATE: 89 BPM | WEIGHT: 250 LBS | BODY MASS INDEX: 35.79 KG/M2 | SYSTOLIC BLOOD PRESSURE: 137 MMHG | HEIGHT: 70 IN | OXYGEN SATURATION: 98 % | RESPIRATION RATE: 18 BRPM | TEMPERATURE: 97.8 F

## 2017-10-13 DIAGNOSIS — N83.209 OVARIAN CYST: ICD-10-CM

## 2017-10-13 DIAGNOSIS — R10.9 ABDOMINAL PAIN: Primary | ICD-10-CM

## 2017-10-13 LAB
ALBUMIN SERPL BCP-MCNC: 3.3 G/DL (ref 3.5–5)
ALP SERPL-CCNC: 64 U/L (ref 46–116)
ALT SERPL W P-5'-P-CCNC: 22 U/L (ref 12–78)
ANION GAP SERPL CALCULATED.3IONS-SCNC: 9 MMOL/L (ref 4–13)
APTT PPP: 25 SECONDS (ref 24–33)
AST SERPL W P-5'-P-CCNC: 15 U/L (ref 5–45)
BASOPHILS # BLD AUTO: 0.1 THOUSANDS/ΜL (ref 0–0.1)
BASOPHILS NFR BLD AUTO: 2 % (ref 0–1)
BILIRUB SERPL-MCNC: 0.1 MG/DL (ref 0.2–1)
BILIRUB UR QL STRIP: NEGATIVE
BUN SERPL-MCNC: 15 MG/DL (ref 5–25)
CALCIUM SERPL-MCNC: 8.6 MG/DL (ref 8.3–10.1)
CHLORIDE SERPL-SCNC: 104 MMOL/L (ref 100–108)
CLARITY UR: CLEAR
CO2 SERPL-SCNC: 24 MMOL/L (ref 21–32)
COLOR UR: NORMAL
CREAT SERPL-MCNC: 0.78 MG/DL (ref 0.6–1.3)
EOSINOPHIL # BLD AUTO: 0.1 THOUSAND/ΜL (ref 0–0.61)
EOSINOPHIL NFR BLD AUTO: 2 % (ref 0–6)
ERYTHROCYTE [DISTWIDTH] IN BLOOD BY AUTOMATED COUNT: 16.2 % (ref 11.6–15.1)
GFR SERPL CREATININE-BSD FRML MDRD: 113 ML/MIN/1.73SQ M
GLUCOSE SERPL-MCNC: 95 MG/DL (ref 65–140)
GLUCOSE UR STRIP-MCNC: NEGATIVE MG/DL
HCT VFR BLD AUTO: 34.9 % (ref 37–47)
HGB BLD-MCNC: 11.3 G/DL (ref 12–16)
HGB UR QL STRIP.AUTO: NEGATIVE
INR PPP: 0.93 (ref 0.86–1.16)
KETONES UR STRIP-MCNC: NEGATIVE MG/DL
LEUKOCYTE ESTERASE UR QL STRIP: NEGATIVE
LIPASE SERPL-CCNC: 92 U/L (ref 73–393)
LYMPHOCYTES # BLD AUTO: 2 THOUSANDS/ΜL (ref 0.6–4.47)
LYMPHOCYTES NFR BLD AUTO: 45 % (ref 14–44)
MCH RBC QN AUTO: 27.2 PG (ref 27–31)
MCHC RBC AUTO-ENTMCNC: 32.3 G/DL (ref 31.4–37.4)
MCV RBC AUTO: 84 FL (ref 82–98)
MONOCYTES # BLD AUTO: 0.3 THOUSAND/ΜL (ref 0.17–1.22)
MONOCYTES NFR BLD AUTO: 8 % (ref 4–12)
NEUTROPHILS # BLD AUTO: 1.8 THOUSANDS/ΜL (ref 1.85–7.62)
NEUTS SEG NFR BLD AUTO: 43 % (ref 43–75)
NITRITE UR QL STRIP: NEGATIVE
PH UR STRIP.AUTO: 5.5 [PH] (ref 5–9)
PLATELET # BLD AUTO: 348 THOUSANDS/UL (ref 130–400)
PMV BLD AUTO: 8.2 FL (ref 8.9–12.7)
POTASSIUM SERPL-SCNC: 4 MMOL/L (ref 3.5–5.3)
PROT SERPL-MCNC: 7.6 G/DL (ref 6.4–8.2)
PROT UR STRIP-MCNC: NEGATIVE MG/DL
PROTHROMBIN TIME: 9.8 SECONDS (ref 9.4–11.7)
RBC # BLD AUTO: 4.15 MILLION/UL (ref 4.2–5.4)
SODIUM SERPL-SCNC: 137 MMOL/L (ref 136–145)
SP GR UR STRIP.AUTO: 1.02 (ref 1–1.03)
UROBILINOGEN UR QL STRIP.AUTO: 0.2 E.U./DL
WBC # BLD AUTO: 4.3 THOUSAND/UL (ref 4.8–10.8)

## 2017-10-13 PROCEDURE — 80053 COMPREHEN METABOLIC PANEL: CPT | Performed by: EMERGENCY MEDICINE

## 2017-10-13 PROCEDURE — 99285 EMERGENCY DEPT VISIT HI MDM: CPT

## 2017-10-13 PROCEDURE — 81003 URINALYSIS AUTO W/O SCOPE: CPT | Performed by: EMERGENCY MEDICINE

## 2017-10-13 PROCEDURE — 85730 THROMBOPLASTIN TIME PARTIAL: CPT | Performed by: EMERGENCY MEDICINE

## 2017-10-13 PROCEDURE — 74176 CT ABD & PELVIS W/O CONTRAST: CPT

## 2017-10-13 PROCEDURE — 83690 ASSAY OF LIPASE: CPT | Performed by: EMERGENCY MEDICINE

## 2017-10-13 PROCEDURE — 96375 TX/PRO/DX INJ NEW DRUG ADDON: CPT

## 2017-10-13 PROCEDURE — 96376 TX/PRO/DX INJ SAME DRUG ADON: CPT

## 2017-10-13 PROCEDURE — 85610 PROTHROMBIN TIME: CPT | Performed by: EMERGENCY MEDICINE

## 2017-10-13 PROCEDURE — 81025 URINE PREGNANCY TEST: CPT | Performed by: EMERGENCY MEDICINE

## 2017-10-13 PROCEDURE — 96374 THER/PROPH/DIAG INJ IV PUSH: CPT

## 2017-10-13 PROCEDURE — 85025 COMPLETE CBC W/AUTO DIFF WBC: CPT | Performed by: EMERGENCY MEDICINE

## 2017-10-13 PROCEDURE — 36415 COLL VENOUS BLD VENIPUNCTURE: CPT | Performed by: EMERGENCY MEDICINE

## 2017-10-13 RX ORDER — MORPHINE SULFATE 4 MG/ML
4 INJECTION, SOLUTION INTRAMUSCULAR; INTRAVENOUS ONCE
Status: COMPLETED | OUTPATIENT
Start: 2017-10-13 | End: 2017-10-13

## 2017-10-13 RX ORDER — ONDANSETRON 2 MG/ML
4 INJECTION INTRAMUSCULAR; INTRAVENOUS ONCE
Status: COMPLETED | OUTPATIENT
Start: 2017-10-13 | End: 2017-10-13

## 2017-10-13 RX ADMIN — ONDANSETRON 4 MG: 2 INJECTION INTRAMUSCULAR; INTRAVENOUS at 10:12

## 2017-10-13 RX ADMIN — MORPHINE SULFATE 4 MG: 4 INJECTION, SOLUTION INTRAMUSCULAR; INTRAVENOUS at 12:30

## 2017-10-13 RX ADMIN — MORPHINE SULFATE 4 MG: 4 INJECTION, SOLUTION INTRAMUSCULAR; INTRAVENOUS at 10:16

## 2017-10-13 RX ADMIN — FAMOTIDINE 20 MG: 10 INJECTION, SOLUTION INTRAVENOUS at 10:14

## 2017-10-13 NOTE — ED NOTES
Unable to obtain enough blood from left EJ site for specimen recollect, dr Jackman Heading aware       Galileo Dahl RN  10/13/17 0131

## 2017-10-13 NOTE — DISCHARGE INSTRUCTIONS
Abdominal Pain   AMBULATORY CARE:   Abdominal pain  can be dull, achy, or sharp  You may have pain in one area of your abdomen, or in your entire abdomen  Your pain may be caused by a condition such as constipation, food sensitivity or poisoning, infection, or a blockage  Abdominal pain can also be from a hernia, appendicitis, or an ulcer  Liver, gallbladder, or kidney conditions can also cause abdominal pain  The cause of your abdominal pain may be unknown  Seek care immediately if:   · You have new chest pain or shortness of breath  · You have pulsing pain in your upper abdomen or lower back that suddenly becomes constant  · Your pain is in the right lower abdominal area and worsens with movement  · You have a fever over 100 4°F (38°C) or shaking chills  · You are vomiting and cannot keep food or liquids down  · Your pain does not improve or gets worse over the next 8 to 12 hours  · You see blood in your vomit or bowel movements, or they look black and tarry  · Your skin or the whites of your eyes turn yellow  · You are a woman and have a large amount of vaginal bleeding that is not your monthly period  Contact your healthcare provider if:   · You have pain in your lower back  · You are a man and have pain in your testicles  · You have pain when you urinate  · You have questions or concerns about your condition or care  Treatment for abdominal pain  may include medicine to calm your stomach, prevent vomiting, or decrease pain  Follow up with your healthcare provider as directed:  Write down your questions so you remember to ask them during your visits  © 2017 [employer]  Information is for End User's use only and may not be sold, redistributed or otherwise used for commercial purposes  All illustrations and images included in CareNotes® are the copyrighted property of [employer]  or [employer]    The above information is an educational aid only  It is not intended as medical advice for individual conditions or treatments  Talk to your doctor, nurse or pharmacist before following any medical regimen to see if it is safe and effective for you  Ovarian Cyst   WHAT YOU NEED TO KNOW:   An ovarian cyst is a sac that grows on an ovary  This sac usually contains fluid, but may sometimes have blood or tissue in it  Most ovarian cysts are harmless and go away without treatment in a few months  Some cysts can grow large, cause pain, or break open  DISCHARGE INSTRUCTIONS:   Call 911 for any of the following:   · You are too weak or dizzy to stand up  Seek care immediately if:   · You have severe abdominal pain  The pain may be sharp and sudden  · You have a fever  Contact your healthcare provider if:   · Your periods are early, late, or more painful than usual     · You have bleeding from your vagina that is not your period  · You have abdominal pain all the time  · Your abdomen is swollen  · You have feelings of fullness, pressure, or discomfort in your abdomen  · You have trouble urinating or emptying your bladder completely  · You have pain during sex  · You are losing weight without trying  · You have questions or concerns about your condition or care  Medicines: You may need any of the following:  · NSAIDs , such as ibuprofen, help decrease swelling, pain, and fever  This medicine is available with or without a doctor's order  NSAIDs can cause stomach bleeding or kidney problems in certain people  If you take blood thinner medicine, always ask if NSAIDs are safe for you  Always read the medicine label and follow directions  Do not give these medicines to children under 10months of age without direction from your child's healthcare provider  · Birth control pills  may help to control your periods, prevent cysts, or cause them to shrink  · Take your medicine as directed    Contact your healthcare provider if you think your medicine is not helping or if you have side effects  Tell him or her if you are allergic to any medicine  Keep a list of the medicines, vitamins, and herbs you take  Include the amounts, and when and why you take them  Bring the list or the pill bottles to follow-up visits  Carry your medicine list with you in case of an emergency  Follow up with your healthcare provider as directed:  Write down your questions so you remember to ask them during your visits  Apply heat to decrease pain and cramping:  Sit in a warm bath, or place a heating pad (turned on low) or a hot water bottle on your abdomen  Do this for 15 to 20 minutes every hour for as many days as directed  © 2017 [facility] Information is for End User's use only and may not be sold, redistributed or otherwise used for commercial purposes  All illustrations and images included in CareNotes® are the copyrighted property of [employer]  or [employer]air Casillas  The above information is an  only  It is not intended as medical advice for individual conditions or treatments  Talk to your doctor, nurse or pharmacist before following any medical regimen to see if it is safe and effective for you

## 2017-10-13 NOTE — ED NOTES
Dr Corie Weinstein notified of difficult NSL attempts by multiple RNs  Pt states she usually has a PICC line  24 gauge NSL inserted in right finger, no labs obtained  CT with contrast ordered, higher, larger NSL required       Leni Tyson RN  10/13/17 6145

## 2017-10-27 ENCOUNTER — HOSPITAL ENCOUNTER (INPATIENT)
Facility: HOSPITAL | Age: 37
LOS: 3 days | Discharge: HOME | DRG: 386 | End: 2017-10-30
Attending: EMERGENCY MEDICINE | Admitting: FAMILY MEDICINE
Payer: COMMERCIAL

## 2017-10-27 ENCOUNTER — APPOINTMENT (EMERGENCY)
Dept: RADIOLOGY | Facility: HOSPITAL | Age: 37
DRG: 386 | End: 2017-10-27
Payer: COMMERCIAL

## 2017-10-27 DIAGNOSIS — K50.90 CROHN'S DISEASE (HCC): Chronic | ICD-10-CM

## 2017-10-27 DIAGNOSIS — N82.4 ENTEROVAGINAL FISTULA: ICD-10-CM

## 2017-10-27 DIAGNOSIS — K62.5 RECTAL BLEED: Primary | ICD-10-CM

## 2017-10-27 LAB
ABO GROUP BLD: NORMAL
ALBUMIN SERPL BCP-MCNC: 3.7 G/DL (ref 3.5–5)
ALP SERPL-CCNC: 84 U/L (ref 46–116)
ALT SERPL W P-5'-P-CCNC: 33 U/L (ref 12–78)
ANION GAP SERPL CALCULATED.3IONS-SCNC: 8 MMOL/L (ref 4–13)
AST SERPL W P-5'-P-CCNC: 40 U/L (ref 5–45)
BASOPHILS # BLD AUTO: 0 THOUSANDS/ΜL (ref 0–0.1)
BASOPHILS NFR BLD AUTO: 0 % (ref 0–1)
BILIRUB SERPL-MCNC: 0.3 MG/DL (ref 0.2–1)
BLD GP AB SCN SERPL QL: NEGATIVE
BUN SERPL-MCNC: 14 MG/DL (ref 5–25)
CALCIUM SERPL-MCNC: 8.7 MG/DL (ref 8.3–10.1)
CHLORIDE SERPL-SCNC: 102 MMOL/L (ref 100–108)
CO2 SERPL-SCNC: 27 MMOL/L (ref 21–32)
CREAT SERPL-MCNC: 0.99 MG/DL (ref 0.6–1.3)
EOSINOPHIL # BLD AUTO: 0.1 THOUSAND/ΜL (ref 0–0.61)
EOSINOPHIL NFR BLD AUTO: 2 % (ref 0–6)
ERYTHROCYTE [DISTWIDTH] IN BLOOD BY AUTOMATED COUNT: 16.4 % (ref 11.6–15.1)
GFR SERPL CREATININE-BSD FRML MDRD: 85 ML/MIN/1.73SQ M
GLUCOSE SERPL-MCNC: 102 MG/DL (ref 65–140)
HCT VFR BLD AUTO: 35.6 % (ref 37–47)
HGB BLD-MCNC: 11.6 G/DL (ref 12–16)
LYMPHOCYTES # BLD AUTO: 1.4 THOUSANDS/ΜL (ref 0.6–4.47)
LYMPHOCYTES NFR BLD AUTO: 34 % (ref 14–44)
MCH RBC QN AUTO: 27.7 PG (ref 27–31)
MCHC RBC AUTO-ENTMCNC: 32.6 G/DL (ref 31.4–37.4)
MCV RBC AUTO: 85 FL (ref 82–98)
MONOCYTES # BLD AUTO: 0.3 THOUSAND/ΜL (ref 0.17–1.22)
MONOCYTES NFR BLD AUTO: 7 % (ref 4–12)
NEUTROPHILS # BLD AUTO: 2.4 THOUSANDS/ΜL (ref 1.85–7.62)
NEUTS SEG NFR BLD AUTO: 56 % (ref 43–75)
NRBC BLD AUTO-RTO: 0 /100 WBCS
PLATELET # BLD AUTO: 390 THOUSANDS/UL (ref 130–400)
PMV BLD AUTO: 8.6 FL (ref 8.9–12.7)
POTASSIUM SERPL-SCNC: 4.6 MMOL/L (ref 3.5–5.3)
PROT SERPL-MCNC: 8.5 G/DL (ref 6.4–8.2)
RBC # BLD AUTO: 4.18 MILLION/UL (ref 4.2–5.4)
RH BLD: POSITIVE
SODIUM SERPL-SCNC: 137 MMOL/L (ref 136–145)
SPECIMEN EXPIRATION DATE: NORMAL
WBC # BLD AUTO: 4.2 THOUSAND/UL (ref 4.8–10.8)

## 2017-10-27 PROCEDURE — 99285 EMERGENCY DEPT VISIT HI MDM: CPT

## 2017-10-27 PROCEDURE — 80053 COMPREHEN METABOLIC PANEL: CPT | Performed by: EMERGENCY MEDICINE

## 2017-10-27 PROCEDURE — 87081 CULTURE SCREEN ONLY: CPT | Performed by: INTERNAL MEDICINE

## 2017-10-27 PROCEDURE — 85025 COMPLETE CBC W/AUTO DIFF WBC: CPT | Performed by: EMERGENCY MEDICINE

## 2017-10-27 PROCEDURE — 96361 HYDRATE IV INFUSION ADD-ON: CPT

## 2017-10-27 PROCEDURE — 86901 BLOOD TYPING SEROLOGIC RH(D): CPT | Performed by: EMERGENCY MEDICINE

## 2017-10-27 PROCEDURE — 86900 BLOOD TYPING SEROLOGIC ABO: CPT | Performed by: EMERGENCY MEDICINE

## 2017-10-27 PROCEDURE — 36415 COLL VENOUS BLD VENIPUNCTURE: CPT | Performed by: EMERGENCY MEDICINE

## 2017-10-27 PROCEDURE — 96374 THER/PROPH/DIAG INJ IV PUSH: CPT

## 2017-10-27 PROCEDURE — 96375 TX/PRO/DX INJ NEW DRUG ADDON: CPT

## 2017-10-27 PROCEDURE — 86850 RBC ANTIBODY SCREEN: CPT | Performed by: EMERGENCY MEDICINE

## 2017-10-27 PROCEDURE — 74177 CT ABD & PELVIS W/CONTRAST: CPT

## 2017-10-27 RX ORDER — ONDANSETRON 2 MG/ML
4 INJECTION INTRAMUSCULAR; INTRAVENOUS EVERY 4 HOURS PRN
Status: DISCONTINUED | OUTPATIENT
Start: 2017-10-27 | End: 2017-10-30 | Stop reason: HOSPADM

## 2017-10-27 RX ORDER — ONDANSETRON 2 MG/ML
4 INJECTION INTRAMUSCULAR; INTRAVENOUS ONCE
Status: COMPLETED | OUTPATIENT
Start: 2017-10-27 | End: 2017-10-27

## 2017-10-27 RX ORDER — METRONIDAZOLE 500 MG/1
500 TABLET ORAL EVERY 8 HOURS SCHEDULED
Status: DISCONTINUED | OUTPATIENT
Start: 2017-10-27 | End: 2017-10-30 | Stop reason: HOSPADM

## 2017-10-27 RX ORDER — PRAZOSIN HYDROCHLORIDE 1 MG/1
2 CAPSULE ORAL
Status: DISCONTINUED | OUTPATIENT
Start: 2017-10-27 | End: 2017-10-30 | Stop reason: HOSPADM

## 2017-10-27 RX ORDER — MORPHINE SULFATE 4 MG/ML
4 INJECTION, SOLUTION INTRAMUSCULAR; INTRAVENOUS ONCE
Status: COMPLETED | OUTPATIENT
Start: 2017-10-27 | End: 2017-10-27

## 2017-10-27 RX ORDER — SODIUM CHLORIDE 9 MG/ML
125 INJECTION, SOLUTION INTRAVENOUS CONTINUOUS
Status: DISCONTINUED | OUTPATIENT
Start: 2017-10-27 | End: 2017-10-28

## 2017-10-27 RX ORDER — MORPHINE SULFATE 2 MG/ML
2 INJECTION, SOLUTION INTRAMUSCULAR; INTRAVENOUS EVERY 4 HOURS PRN
Status: DISCONTINUED | OUTPATIENT
Start: 2017-10-27 | End: 2017-10-29

## 2017-10-27 RX ORDER — POLYETHYLENE GLYCOL 3350 17 G/17G
17 POWDER, FOR SOLUTION ORAL DAILY
Status: DISCONTINUED | OUTPATIENT
Start: 2017-10-28 | End: 2017-10-30 | Stop reason: HOSPADM

## 2017-10-27 RX ORDER — AMOXICILLIN AND CLAVULANATE POTASSIUM 875; 125 MG/1; MG/1
1 TABLET, FILM COATED ORAL EVERY 12 HOURS SCHEDULED
Status: DISCONTINUED | OUTPATIENT
Start: 2017-10-27 | End: 2017-10-27 | Stop reason: ALTCHOICE

## 2017-10-27 RX ORDER — MAGNESIUM HYDROXIDE/ALUMINUM HYDROXICE/SIMETHICONE 120; 1200; 1200 MG/30ML; MG/30ML; MG/30ML
30 SUSPENSION ORAL EVERY 6 HOURS PRN
Status: DISCONTINUED | OUTPATIENT
Start: 2017-10-27 | End: 2017-10-30 | Stop reason: HOSPADM

## 2017-10-27 RX ORDER — QUETIAPINE FUMARATE 100 MG/1
100 TABLET, FILM COATED ORAL
Status: DISCONTINUED | OUTPATIENT
Start: 2017-10-27 | End: 2017-10-30 | Stop reason: HOSPADM

## 2017-10-27 RX ORDER — TRAZODONE HYDROCHLORIDE 50 MG/1
150 TABLET ORAL
Status: DISCONTINUED | OUTPATIENT
Start: 2017-10-27 | End: 2017-10-30 | Stop reason: HOSPADM

## 2017-10-27 RX ORDER — AMLODIPINE BESYLATE 10 MG/1
10 TABLET ORAL DAILY
Status: DISCONTINUED | OUTPATIENT
Start: 2017-10-28 | End: 2017-10-30 | Stop reason: HOSPADM

## 2017-10-27 RX ORDER — DICYCLOMINE HCL 20 MG
20 TABLET ORAL
Status: DISCONTINUED | OUTPATIENT
Start: 2017-10-27 | End: 2017-10-30 | Stop reason: HOSPADM

## 2017-10-27 RX ADMIN — QUETIAPINE FUMARATE 100 MG: 100 TABLET, FILM COATED ORAL at 22:12

## 2017-10-27 RX ADMIN — MORPHINE SULFATE 4 MG: 4 INJECTION, SOLUTION INTRAMUSCULAR; INTRAVENOUS at 14:53

## 2017-10-27 RX ADMIN — SODIUM CHLORIDE 1000 ML: 0.9 INJECTION, SOLUTION INTRAVENOUS at 12:46

## 2017-10-27 RX ADMIN — METRONIDAZOLE 500 MG: 500 TABLET ORAL at 22:11

## 2017-10-27 RX ADMIN — SODIUM CHLORIDE 125 ML/HR: 0.9 INJECTION, SOLUTION INTRAVENOUS at 17:58

## 2017-10-27 RX ADMIN — ONDANSETRON 4 MG: 2 INJECTION INTRAMUSCULAR; INTRAVENOUS at 14:52

## 2017-10-27 RX ADMIN — APIXABAN 5 MG: 5 TABLET, FILM COATED ORAL at 18:26

## 2017-10-27 RX ADMIN — IOHEXOL 100 ML: 350 INJECTION, SOLUTION INTRAVENOUS at 13:47

## 2017-10-27 RX ADMIN — DICYCLOMINE HYDROCHLORIDE 20 MG: 20 TABLET ORAL at 22:56

## 2017-10-27 RX ADMIN — ONDANSETRON 4 MG: 2 INJECTION INTRAMUSCULAR; INTRAVENOUS at 22:12

## 2017-10-27 RX ADMIN — MORPHINE SULFATE 2 MG: 2 INJECTION, SOLUTION INTRAMUSCULAR; INTRAVENOUS at 22:12

## 2017-10-27 RX ADMIN — MORPHINE SULFATE 2 MG: 2 INJECTION, SOLUTION INTRAMUSCULAR; INTRAVENOUS at 18:26

## 2017-10-27 RX ADMIN — PRAZOSIN HYDROCHLORIDE 2 MG: 1 CAPSULE ORAL at 22:56

## 2017-10-27 RX ADMIN — TRAZODONE HYDROCHLORIDE 150 MG: 50 TABLET ORAL at 22:11

## 2017-10-27 NOTE — ED PROVIDER NOTES
History  Chief Complaint   Patient presents with    Rectal Bleeding     pt reports hx of rectal bleeding - describes large amount today sent by PMD    Abdominal Pain     sx started this am     The patient is a 71-year-old black female presents with complaint of while in the shower started having severe rectal bleeding  Patient was recently here 2 weeks ago for some spotting while she moved her bowels in the bathroom had a workup that was negative that time  Patient states she has also have some diffuse abdominal pain crampy in nature nonradiating  Patient denies any fevers or chills, no recent issues with bowel movements  Patient states she has a history of Crohn's disease is had resections in the past   There is no aggravating or alleviating factors, patient denies any chest pain, no lightheaded no dizziness, no shortness of breath or dyspnea on exertion  Does amount of bleeding the patient got concerned called 911 and was brought in by ambulance for evaluation  Prior to Admission Medications   Prescriptions Last Dose Informant Patient Reported? Taking?    QUEtiapine (SEROquel) 100 mg tablet   Yes No   Sig: Take 100 mg by mouth daily at bedtime   amLODIPine (NORVASC) 5 mg tablet   Yes No   Sig: Take 10 mg by mouth daily     apixaban (ELIQUIS) 5 mg   Yes No   Sig: Take 5 mg by mouth 2 (two) times a day   butalbital-acetaminophen-caffeine (FIORICET,ESGIC) -40 mg per tablet   Yes No   Sig: Take 1 tablet by mouth every 4 (four) hours as needed for headaches   dicyclomine (BENTYL) 20 mg tablet   Yes No   Sig: Take 20 mg by mouth every 6 (six) hours   ondansetron (ZOFRAN-ODT) 4 mg disintegrating tablet   No No   Sig: Take 1 tablet by mouth every 6 (six) hours as needed for nausea or vomiting   prazosin (MINIPRESS) 2 mg capsule   Yes No   Sig: Take 2 mg by mouth daily at bedtime   traZODone (DESYREL) 150 mg tablet  Self Yes No   Sig: Take 150 mg by mouth daily at bedtime      Facility-Administered Medications: None       Past Medical History:   Diagnosis Date    Bowel obstruction     Cancer ([facility] [address] )     ovarian    Chronic narcotic dependence (HCC)     Chronic pain     Chronic ulcerative colitis ([facility] [address] )     Colitis     Fibroids     H/O blood clots     Hypertension     IBS (irritable bowel syndrome)     Obesity (BMI 30 0-34  9)     Recurrent Pelvic fluid collection     Vaginal fistula        Past Surgical History:   Procedure Laterality Date    ABDOMINAL SURGERY      per pt "about 20"    APPENDECTOMY      BOWEL RESECTION      BOWEL RESECTION      "removed entire large bowel"     SECTION      CHOLECYSTECTOMY      COLON SURGERY      EVACUATION OF HEMATOMA      EXAMINATION UNDER ANESTHESIA N/A 2016    Procedure: EXAM UNDER ANESTHESIA (EUA)  pouch-oscopy;  Surgeon: Malena Castillo MD;  [facility];  Service:     HERNIA REPAIR      ILEOSTOMY      PARACENTESIS      PARTIAL HYSTERECTOMY      [place] COLONOSCOPY FLX DX W/COLLJ [facility] WHEN PFRMD N/A 2017    Procedure: COLONOSCOPY;  Surgeon: Teresa Morgan MD;  [facility]; Service: Colorectal    TUBAL LIGATION         Family History   Problem Relation Age of Onset    Ulcerative colitis Mother     Cancer Paternal Grandfather      I have reviewed and agree with the history as documented  Social History   Substance Use Topics    Smoking status: Never Smoker    Smokeless tobacco: Never Used    Alcohol use Yes      Comment: occasional        Review of Systems   Constitutional: Negative for chills and fever  HENT: Negative for facial swelling and trouble swallowing  Respiratory: Negative for choking and shortness of breath  Cardiovascular: Negative for chest pain and leg swelling  Gastrointestinal: Positive for anal bleeding  Negative for abdominal distention, nausea, rectal pain and vomiting  Genitourinary: Negative for dysuria, flank pain, urgency, vaginal bleeding and vaginal discharge  Musculoskeletal: Negative for back pain and neck pain  Skin: Negative  Neurological: Negative for weakness, light-headedness, numbness and headaches  Hematological: Negative  Psychiatric/Behavioral: Negative  Physical Exam  ED Triage Vitals [10/27/17 1157]   Temperature Pulse Respirations Blood Pressure SpO2   98 °F (36 7 °C) 91 18 132/82 97 %      Temp Source Heart Rate Source Patient Position - Orthostatic VS BP Location FiO2 (%)   Oral Monitor Lying Left arm --      Pain Score       8           Orthostatic Vital Signs  Vitals:    10/27/17 1157   BP: 132/82   Pulse: 91   Patient Position - Orthostatic VS: Lying       Physical Exam   Constitutional: She is oriented to person, place, and time  She appears well-developed and well-nourished  HENT:   Head: Normocephalic and atraumatic  Eyes: Conjunctivae and EOM are normal  Pupils are equal, round, and reactive to light  Neck: Neck supple  Cardiovascular: Normal rate and regular rhythm  Pulmonary/Chest: Effort normal and breath sounds normal  No respiratory distress  Abdominal: Soft  She exhibits no distension and no mass  There is tenderness  There is no rebound and no guarding  Genitourinary: Rectal exam shows guaiac positive stool  Rectal exam shows no external hemorrhoid and no internal hemorrhoid  Genitourinary Comments: There is gross red blood on the patient's buttock around the rectum a rectal exam showed no internal external hemorrhoids and had bright red blood  Musculoskeletal: Normal range of motion  Neurological: She is alert and oriented to person, place, and time  Skin: Skin is dry  Capillary refill takes less than 2 seconds  Nursing note and vitals reviewed        ED Medications  Medications   sodium chloride 0 9 % bolus 1,000 mL (1,000 mL Intravenous New Bag 10/27/17 1246)   iohexol (OMNIPAQUE) 350 MG/ML injection (MULTI-DOSE) 100 mL (100 mL Intravenous Given 10/27/17 1347)   ondansetron (ZOFRAN) injection 4 mg (4 mg Intravenous Given 10/27/17 1452)   morphine (PF) 4 mg/mL injection 4 mg (4 mg Intravenous Given 10/27/17 1453)       Diagnostic Studies  Results Reviewed     Procedure Component Value Units Date/Time    Comprehensive metabolic panel [[id number]]  (Abnormal) Collected:  10/27/17 1235    Lab Status:  Final result Specimen:  Blood from Arm, Right Updated:  10/27/17 1319     Sodium 137 mmol/L      Potassium 4 6 mmol/L      Chloride 102 mmol/L      CO2 27 mmol/L      Anion Gap 8 mmol/L      BUN 14 mg/dL      Creatinine 0 99 mg/dL      Glucose 102 mg/dL      Calcium 8 7 mg/dL      AST 40 U/L      ALT 33 U/L      Alkaline Phosphatase 84 U/L      Total Protein 8 5 (H) g/dL      Albumin 3 7 g/dL      Total Bilirubin 0 30 mg/dL      eGFR 85 ml/min/1 73sq m     Narrative:         National Kidney Disease Education Program recommendations are as follows:  GFR calculation is accurate only with a steady state creatinine  Chronic Kidney disease less than 60 ml/min/1 73 sq  meters  Kidney failure less than 15 ml/min/1 73 sq  meters  CBC and differential [[id number]]  (Abnormal) Collected:  10/27/17 1235    Lab Status:  Final result Specimen:  Blood from Arm, Right Updated:  10/27/17 1302     WBC 4 20 (L) Thousand/uL      RBC 4 18 (L) Million/uL      Hemoglobin 11 6 (L) g/dL      Hematocrit 35 6 (L) %      MCV 85 fL      MCH 27 7 pg      MCHC 32 6 g/dL      RDW 16 4 (H) %      MPV 8 6 (L) fL      Platelets 640 Thousands/uL      nRBC 0 /100 WBCs      Neutrophils Relative 56 %      Lymphocytes Relative 34 %      Monocytes Relative 7 %      Eosinophils Relative 2 %      Basophils Relative 0 %      Neutrophils Absolute 2 40 Thousands/µL      Lymphocytes Absolute 1 40 Thousands/µL      Monocytes Absolute 0 30 Thousand/µL      Eosinophils Absolute 0 10 Thousand/µL      Basophils Absolute 0 00 Thousands/µL                  CT abdomen pelvis with contrast   Final Result by Patsy Colmenares MD (10/27 1412)         1    Postoperative changes  2   Moderate amount of feces in the small bowel just proximal to an anastomosis with the rectum  Workstation performed: [id number]                    Procedures  Procedures       Phone Contacts  ED Phone Contact    ED Course  ED Course                                MDM  Number of Diagnoses or Management Options  Rectal bleed:   Diagnosis management comments: Rectal bleeding, patient's hemoglobin is stable, CT       Amount and/or Complexity of Data Reviewed  Clinical lab tests: ordered  Tests in the radiology section of CPT®: ordered      CritCare Time    Disposition  Final diagnoses:   Rectal bleed     Time reflects when diagnosis was documented in both MDM as applicable and the Disposition within this note     Time User Action Codes Description Comment    10/27/2017  3:00 PM Alicia Lamin Add [K62 5] Rectal bleed       ED Disposition     ED Disposition Condition Comment    Admit  Case was discussed with Dr Carlos Addison   and the patient's admission status was agreed to be Admission Status: inpatient status to the service of Dr Carlos Addison     Follow-up Information    None       Patient's Medications   Discharge Prescriptions    No medications on file     No discharge procedures on file      ED Provider  Electronically Signed by           Ness Bernal MD  10/27/17 4292

## 2017-10-27 NOTE — H&P
History and Physical - [facility] Internal Medicine    Patient Information: Muriel Campbell 40 y o  female MRN: [id number]  Unit/Bed#: [address] 203-94 Encounter: [id number]  Admitting Physician: Hans Conner MD  PCP: Elis Rubin MD  Date of Admission:  10/27/17        Chief Complaint:   Bleeding per rectum    History of Present Illness:    Muriel Campbell is a 40 y o  female who presents with history of Crohn's disease with anti Ro vaginal fistula  Patient presented to the ER as she was pouring out blood from her rectum while she was having a shower  Patient stated that her blood is fresh red with blood clots in it  She also complained of increasing abdominal pain that is typical with her Crohn's disease exacerbation  Patient vomited twice but no blood with her vomitus  In the ER, patient had a CT scan of the abdomen and pelvis that showed constipation with no obvious colitis  Patient will be admitted for further management  Patient stated that she had fever at home of 101 2  Afebrile  Denies any chest pain, SOB, coughing or palpitations  No dysuria or polyuria  No headaches or dizziness  Review of Systems:  All 10 point review of systems was discussed and otherwise negative    Past Medical and Surgical History:     Past Medical History:   Diagnosis Date    Bowel obstruction     Cancer ([address] )     ovarian    Chronic narcotic dependence ([address] )     Chronic pain     Chronic ulcerative colitis [address] [address]     Colitis     Fibroids     H/O blood clots     Hypertension     IBS (irritable bowel syndrome)     Obesity (BMI 30 0-34  9)     Recurrent Pelvic fluid collection     Vaginal fistula        Past Surgical History:   Procedure Laterality Date    ABDOMINAL SURGERY      per pt "about 20"    APPENDECTOMY      BOWEL RESECTION      BOWEL RESECTION      "removed entire large bowel"     SECTION      CHOLECYSTECTOMY      COLON SURGERY      EVACUATION OF HEMATOMA      EXAMINATION UNDER ANESTHESIA N/A 8/13/2016    Procedure: EXAM UNDER ANESTHESIA (EUA)  pouch-oscopy;  Surgeon: Viraj Baker MD;  [facility]: [facility];  Service:     HERNIA REPAIR      ILEOSTOMY      PARACENTESIS      PARTIAL HYSTERECTOMY      NC COLONOSCOPY FLX DX W/COLLJ [facility] WHEN PFRMD N/A 6/16/2017    Procedure: COLONOSCOPY;  Surgeon: Gracie Perrin MD;  Location: [facility]; Service: Colorectal    TUBAL LIGATION         Meds/Allergies:    Prior to Admission medications    Medication Sig Start Date End Date Taking? Authorizing Provider   amLODIPine (NORVASC) 5 mg tablet Take 10 mg by mouth daily      Historical Provider, MD   apixaban (ELIQUIS) 5 mg Take 5 mg by mouth 2 (two) times a day    Historical Provider, MD   butalbital-acetaminophen-caffeine (FIORICET,ESGIC) -40 mg per tablet Take 1 tablet by mouth every 4 (four) hours as needed for headaches    Historical Provider, MD   dicyclomine (BENTYL) 20 mg tablet Take 20 mg by mouth every 6 (six) hours    Historical Provider, MD   ondansetron (ZOFRAN-ODT) 4 mg disintegrating tablet Take 1 tablet by mouth every 6 (six) hours as needed for nausea or vomiting 7/28/17   Deanna Tomlinson PA-C   prazosin (MINIPRESS) 2 mg capsule Take 2 mg by mouth daily at bedtime    Historical Provider, MD   QUEtiapine (SEROquel) 100 mg tablet Take 100 mg by mouth daily at bedtime    Historical Provider, MD   traZODone (DESYREL) 150 mg tablet Take 150 mg by mouth daily at bedtime    Historical Provider, MD     I have reviewed home medications with patient personally  Allergies:    Allergies   Allergen Reactions    Ceclor [Cefaclor] Anaphylaxis    Ciprofloxacin Anaphylaxis    Xarelto [Rivaroxaban] Other (See Comments)     Coughing up blood    Betadine [Povidone Iodine] Rash     Pt states washing skin with betadine causes rash    Tylox [Oxycodone-Acetaminophen] Rash     Tolerates morphine       Social History:     Marital Status: /Civil Union     History   Alcohol Use    Yes     Comment: occasional     History   Smoking Status    Never Smoker   Smokeless Tobacco    Never Used     History   Drug Use No       Family History:  Asked and noncontributory    Physical Exam:     Vitals:   Blood Pressure: 138/85 (10/27/17 1644)  Pulse: 89 (10/27/17 1644)  Temperature: 98 °F (36 7 °C) (10/27/17 1644)  Temp Source: Oral (10/27/17 1644)  Respirations: 18 (10/27/17 1644)  Height: 5' 10" (177 8 cm) (10/27/17 1644)  Weight - Scale: 113 kg (249 lb 1 9 oz) (10/27/17 1644)  SpO2: 100 % (10/27/17 1644)    General: Alert , Ox3  Head: Normocephalic atraumatic  Eyes: KIKE  Anicteric sclera  H&N: Supple neck  No palpable lymphadenopathy  Chest CTA BL  No wheezing or crackles  Heart: S1, S2 RRR, no murmur  Abd: soft, non distended  Abdomen is not tender but patient received IV narcotics in the ER  Extremities: No oedema  No clubbing or cyanosis  Skin: Intact, no rash  Neuro: Moving all 4 extremities  Additional Data:     Lab Results: I have personally reviewed pertinent reports  Results from last 7 days  Lab Units 10/27/17  1235   WBC Thousand/uL 4 20*   HEMOGLOBIN g/dL 11 6*   HEMATOCRIT % 35 6*   PLATELETS Thousands/uL 390   NEUTROS PCT % 56   LYMPHS PCT % 34   MONOS PCT % 7   EOS PCT % 2       Results from last 7 days  Lab Units 10/27/17  1235   SODIUM mmol/L 137   POTASSIUM mmol/L 4 6   CHLORIDE mmol/L 102   CO2 mmol/L 27   BUN mg/dL 14   CREATININE mg/dL 0 99   CALCIUM mg/dL 8 7   TOTAL PROTEIN g/dL 8 5*   BILIRUBIN TOTAL mg/dL 0 30   ALK PHOS U/L 84   ALT U/L 33   AST U/L 40   GLUCOSE RANDOM mg/dL 102           Imaging: I have personally reviewed pertinent reports  Ct Abdomen Pelvis Wo Contrast    Result Date: 10/13/2017  Narrative: CT ABDOMEN AND PELVIS WITHOUT IV CONTRAST INDICATION:  Abdominal pain right lower quadrant since last night   COMPARISON: CT abdomen and pelvis 7/28/2017 TECHNIQUE:  CT examination of the abdomen and pelvis was performed without intravenous contrast  Reformatted images were created in axial, sagittal, and coronal planes  Radiation dose length product (DLP) for this visit:  1550 21 mGy-cm   This examination, like all CT scans performed in the [facility], was performed utilizing techniques to minimize radiation dose exposure, including the use of iterative reconstruction and automated exposure control  Enteric contrast was not administered  FINDINGS: ABDOMEN Evaluation of the solid organs is limited without IV contrast  LOWER CHEST:  No significant abnormalities identified in the lower chest  LIVER/BILIARY TREE:  Unremarkable  GALLBLADDER:  Gallbladder is surgically absent  SPLEEN:  Unremarkable  PANCREAS:  Unremarkable  ADRENAL GLANDS:  Unremarkable  KIDNEYS/URETERS:  Unremarkable  No hydronephrosis  STOMACH AND BOWEL:  The stomach is largely collapsed, evaluation limited  Small bowel anastomosis seen in the mid abdomen with some mildly prominent caliber bowel loops seen along the suture lines although no evidence for bowel obstruction  The patient is status post colectomy  Ileoanal anastomosis appears stable  No focal inflammatory changes are seen  APPENDIX:  Surgically absent  ABDOMINOPELVIC CAVITY:  No ascites or free intraperitoneal air  Evaluation of lymphadenopathy is limited in the absence of intravenous contrast  No bulky adenopathy detected on this noncontrast study  6 7 x 2 6 cm ovoid fluid structure in the left adnexa previously measured 5 3 x 2 9 cm, mildly more prominent  VESSELS:  Unremarkable for patient's age  PELVIS REPRODUCTIVE ORGANS:  Patient is status post hysterectomy  URINARY BLADDER:  Unremarkable  ABDOMINAL WALL/INGUINAL REGIONS:  Unremarkable  OSSEOUS STRUCTURES:  No acute fracture or destructive osseous lesion  Impression: No evidence of bowel obstruction or focal inflammatory changes  Status post colectomy  Mild enlargement of 6 7 cm left adnexal cystic structure    Nonemergent follow-up pelvic ultrasound recommended  LIMITED STUDY WITHOUT ORAL OR IV CONTRAST  ##[id number]##[id number] Workstation performed: [id number]     Ct Abdomen Pelvis With Contrast    Result Date: 10/27/2017  Narrative: CT ABDOMEN AND PELVIS WITH IV CONTRAST INDICATION:  Rectal bleeding COMPARISON: 10/13/2017 TECHNIQUE:  CT examination of the abdomen and pelvis was performed  Reformatted images were created in axial, sagittal, and coronal planes  Radiation dose length product (DLP) for this visit:  1270 21 mGy-cm   This examination, like all CT scans performed in the [facility], was performed utilizing techniques to minimize radiation dose exposure, including the use of iterative reconstruction and automated exposure control  IV Contrast:  100 mL of iohexol (OMNIPAQUE)     Enteric Contrast:  Enteric contrast was not administered  FINDINGS: ABDOMEN LOWER CHEST:  Mild dependent changes at the lung bases  LIVER/BILIARY TREE:  Unremarkable  GALLBLADDER:  Cholecystectomy  SPLEEN:  Unremarkable  PANCREAS:  Unremarkable  ADRENAL GLANDS:  Unremarkable  KIDNEYS/URETERS:  Unremarkable  No hydronephrosis  STOMACH AND BOWEL:  A colectomy has been performed  Several bowel anastomoses are present  Moderate amount of feces proximal to the anastomosis of the small bowel with the anus  APPENDIX:  Absent  ABDOMINOPELVIC CAVITY:  No ascites or free intraperitoneal air  No lymphadenopathy  VESSELS:  Unremarkable for patient's age  PELVIS REPRODUCTIVE ORGANS:  Hysterectomy  URINARY BLADDER:  Mild wall thickening  ABDOMINAL WALL/INGUINAL REGIONS:  Fat-containing umbilical hernia  OSSEOUS STRUCTURES:  No acute fracture or destructive osseous lesion  Impression: 1  Postoperative changes  2   Moderate amount of feces in the small bowel just proximal to an anastomosis with the rectum   Workstation performed: [id number]       Assessment/Plan:    Hospital Problem List:     Principal Problem:    Crohn's disease [facility]  Active Problems: Enterovaginal fistula    History of DVT (deep vein thrombosis)    Anxiety and depression      Plan for the Primary Problem(s):  Bleeding per rectum  Crohn's disease exacerbation  Abdominal pain  Pain control with IV morphine  Will keep patient on clear liquid diet  H&H q 12 hours  Transfuse if needed  Currently hemoglobin and vital signs are stable  Will start patient empirically on is Augmentin and Flagyl  Patient is allergic to ciprofloxacin  Will consult GI for further recommendations  History of DVT  Continue Eliquis  Monitor CBC closely while while patient is on anticoagulation  Hypertension  Continue Minipress    Depression  Continue Seroquel and trazodone    VTE Prophylaxis: Apixaban (Eliquis)  / sequential compression device   Code Status: FC  POLST: POLST form is not discussed and not completed at this time  Anticipated Length of Stay:  Patient will be admitted on an Inpatient basis with an anticipated length of stay of  > 2 midnights  Justification for Hospital Stay:  Bleeding per rectum    Total Time for Visit, including Counseling / Coordination of Care: 30 minutes  Greater than 50% of this total time spent on direct patient counseling and coordination of care  ** Please Note: Dragon 360 Dictation voice to text software may have been used in the creation of this document   **

## 2017-10-28 LAB
ANION GAP SERPL CALCULATED.3IONS-SCNC: 9 MMOL/L (ref 4–13)
BUN SERPL-MCNC: 11 MG/DL (ref 5–25)
CALCIUM SERPL-MCNC: 7.8 MG/DL (ref 8.3–10.1)
CHLORIDE SERPL-SCNC: 103 MMOL/L (ref 100–108)
CO2 SERPL-SCNC: 22 MMOL/L (ref 21–32)
CREAT SERPL-MCNC: 0.86 MG/DL (ref 0.6–1.3)
ERYTHROCYTE [DISTWIDTH] IN BLOOD BY AUTOMATED COUNT: 17.1 % (ref 11.6–15.1)
GFR SERPL CREATININE-BSD FRML MDRD: 101 ML/MIN/1.73SQ M
GLUCOSE SERPL-MCNC: 120 MG/DL (ref 65–140)
HCT VFR BLD AUTO: 34.2 % (ref 37–47)
HGB BLD-MCNC: 10.9 G/DL (ref 12–16)
MCH RBC QN AUTO: 27.3 PG (ref 27–31)
MCHC RBC AUTO-ENTMCNC: 32 G/DL (ref 31.4–37.4)
MCV RBC AUTO: 85 FL (ref 82–98)
PLATELET # BLD AUTO: 328 THOUSANDS/UL (ref 130–400)
PMV BLD AUTO: 8.5 FL (ref 8.9–12.7)
POTASSIUM SERPL-SCNC: 4.8 MMOL/L (ref 3.5–5.3)
RBC # BLD AUTO: 4.02 MILLION/UL (ref 4.2–5.4)
SODIUM SERPL-SCNC: 134 MMOL/L (ref 136–145)
WBC # BLD AUTO: 4 THOUSAND/UL (ref 4.8–10.8)

## 2017-10-28 PROCEDURE — 80048 BASIC METABOLIC PNL TOTAL CA: CPT | Performed by: INTERNAL MEDICINE

## 2017-10-28 PROCEDURE — 85027 COMPLETE CBC AUTOMATED: CPT | Performed by: INTERNAL MEDICINE

## 2017-10-28 RX ADMIN — MORPHINE SULFATE 2 MG: 2 INJECTION, SOLUTION INTRAMUSCULAR; INTRAVENOUS at 23:30

## 2017-10-28 RX ADMIN — APIXABAN 5 MG: 5 TABLET, FILM COATED ORAL at 09:19

## 2017-10-28 RX ADMIN — MORPHINE SULFATE 2 MG: 2 INJECTION, SOLUTION INTRAMUSCULAR; INTRAVENOUS at 19:00

## 2017-10-28 RX ADMIN — DICYCLOMINE HYDROCHLORIDE 20 MG: 20 TABLET ORAL at 06:23

## 2017-10-28 RX ADMIN — QUETIAPINE FUMARATE 100 MG: 100 TABLET, FILM COATED ORAL at 21:57

## 2017-10-28 RX ADMIN — HYDROMORPHONE HYDROCHLORIDE 0.5 MG: 1 INJECTION, SOLUTION INTRAMUSCULAR; INTRAVENOUS; SUBCUTANEOUS at 04:57

## 2017-10-28 RX ADMIN — DICYCLOMINE HYDROCHLORIDE 20 MG: 20 TABLET ORAL at 17:01

## 2017-10-28 RX ADMIN — METRONIDAZOLE 500 MG: 500 TABLET ORAL at 06:23

## 2017-10-28 RX ADMIN — MORPHINE SULFATE 2 MG: 2 INJECTION, SOLUTION INTRAMUSCULAR; INTRAVENOUS at 08:45

## 2017-10-28 RX ADMIN — HYDROMORPHONE HYDROCHLORIDE 0.5 MG: 1 INJECTION, SOLUTION INTRAMUSCULAR; INTRAVENOUS; SUBCUTANEOUS at 20:13

## 2017-10-28 RX ADMIN — TRAZODONE HYDROCHLORIDE 150 MG: 50 TABLET ORAL at 21:57

## 2017-10-28 RX ADMIN — METRONIDAZOLE 500 MG: 500 TABLET ORAL at 13:59

## 2017-10-28 RX ADMIN — ONDANSETRON 4 MG: 2 INJECTION INTRAMUSCULAR; INTRAVENOUS at 02:25

## 2017-10-28 RX ADMIN — MORPHINE SULFATE 2 MG: 2 INJECTION, SOLUTION INTRAMUSCULAR; INTRAVENOUS at 13:27

## 2017-10-28 RX ADMIN — AMLODIPINE BESYLATE 10 MG: 10 TABLET ORAL at 09:19

## 2017-10-28 RX ADMIN — APIXABAN 5 MG: 5 TABLET, FILM COATED ORAL at 17:01

## 2017-10-28 RX ADMIN — METRONIDAZOLE 500 MG: 500 TABLET ORAL at 21:57

## 2017-10-28 RX ADMIN — PRAZOSIN HYDROCHLORIDE 2 MG: 1 CAPSULE ORAL at 21:57

## 2017-10-28 RX ADMIN — MORPHINE SULFATE 2 MG: 2 INJECTION, SOLUTION INTRAMUSCULAR; INTRAVENOUS at 02:25

## 2017-10-28 RX ADMIN — SODIUM CHLORIDE 125 ML/HR: 0.9 INJECTION, SOLUTION INTRAVENOUS at 10:43

## 2017-10-28 RX ADMIN — ONDANSETRON 4 MG: 2 INJECTION INTRAMUSCULAR; INTRAVENOUS at 08:45

## 2017-10-28 RX ADMIN — DICYCLOMINE HYDROCHLORIDE 20 MG: 20 TABLET ORAL at 12:34

## 2017-10-28 RX ADMIN — ONDANSETRON 4 MG: 2 INJECTION INTRAMUSCULAR; INTRAVENOUS at 13:26

## 2017-10-28 RX ADMIN — DICYCLOMINE HYDROCHLORIDE 20 MG: 20 TABLET ORAL at 21:57

## 2017-10-28 RX ADMIN — ONDANSETRON 4 MG: 2 INJECTION INTRAMUSCULAR; INTRAVENOUS at 23:32

## 2017-10-28 RX ADMIN — ONDANSETRON 4 MG: 2 INJECTION INTRAMUSCULAR; INTRAVENOUS at 19:00

## 2017-10-28 NOTE — NUTRITION
10/28/17 1420   Assessment   Timepoint Initial  (poor po)   Labs   List Completed Labs (labs reviewed; meds: eliquis, bentyl, flagyl, zofran)   Adequacy of Intake   Nutrition Modality PO  (clear liquids)   Intake Meals %   Estimated Calorie Intake 25-50%   Estimated Protein Intake  0-25%   Estimated Fluid Intake 25-50%   Estimated calorie intake compared to estimated need 100% x1 meal documented; pt will not meet estimated needs d/t clear liquid diet  Will monitor diet tolerance and po intakes as diet adavnces  Nutrition Prognosis   Potential Guarded   Nutrition Concerns (Crohn's, s/p total colectomy & J pouch in 2003, rectal bleeding, pain, nausea)   Comorbid Concerns Depression  (chronic opioid use, enterovaginal fistula)   Nutrition Precautions (diet tolerance)   Nutrition Considerations (Education inappropriate at present)   PES Statement   Problem Intake   Oral or Nutritional Support Intake (2) Inadequate oral intake NI-2 1   Related to Inadequate diet;Nausea;GI distress/pain   As evidenced by: Intake < estimated needs   Additional PES details on clear liquid diet, triggering for poor po   Patient Nutrition Goals   Goal weight maintained;tolerate PO diet;meet PO needs   Goal Status initiated   Timeframe to complete goal by next f/u   Recommendations/Interventions   Summary poor po per RN screen-pt with abdominal pain and vomiting x1 day PTA along w/rectal bleeding  Per GI history of ulcerative colitis status post total abdominal colectomy and J-pouch admitted with rectal bleeding x 1 day  Bleeding is likely secondary to hemorrhoids versus ulcer at the surgical anastomotic site, has been on TPN in past  Will monitor diet advancement and tolerance      Interventions Diet: to Advance   Nutrition Recommendations Continue diet as ordered  (advance diet as medically able, recommend goal diet low fiber/low residue, will monitor diet tolerance)   Nutrition Complexity Risk   Nutrition complexity level Moderate risk   Nutrition review: 10/31/17  (diet advanced, po intakes  )   Follow up date 11/04/17

## 2017-10-28 NOTE — PROGRESS NOTES
[facility] Internal Medicine Progress Note  Patient: Mauro Martel 40 y o  female   MRN: [id number]  PCP: Sergio Hernández MD  Unit/Bed#: [address] [place] [address] Encounter: [id number]  Date Of Visit: 10/28/17      Subjective:   Patient is still having rectal bleed  Still with abdominal pain  Dry heaving but no vomiting  Afebrile  Denies any chest pain, SOB, coughing or palpitations  Physical Exam:     Vitals:   Temp (24hrs), Av 8 °F (36 6 °C), Min:97 7 °F (36 5 °C), Max:98 °F (36 7 °C)    HR:  [] 84  Resp:  [18] 18  BP: (129-138)/(63-85) 129/76  SpO2:  [94 %-100 %] 94 %  Body mass index is 35 74 kg/m²  H&N: Anicteric sclera, supple neck  Chest CTA BL  Heart: S1, S2 RRR, no murmur  Abd: soft, moderate generalized tenderness, non distended  Extremities: No oedema, clubbing or cyanosis  Skin: Intact, no rash  Input and Output Summary (last 24 hours): Intake/Output Summary (Last 24 hours) at 10/28/17 1521  Last data filed at 10/28/17 0851   Gross per 24 hour   Intake              480 ml   Output                0 ml   Net              480 ml       Assessment:    Principal Problem:    Crohn's disease ([address] )  Active Problems:    Enterovaginal fistula    History of DVT (deep vein thrombosis)    Anxiety and depression      Plan:  Bleeding per rectum  Abdominal pain  Patient is status post colectomy  She will be going for sigmoidoscopy to rule out hemorrhoids and evaluate the surgical powder check on Monday  Pain control with IV morphine  continue clear liquid diet  hemoglobin is stable   continue Flagyl        History of DVT  Continue Eliquis    Monitor CBC closely while while patient is on anticoagulation      Hypertension  Continue Minipress     Depression  Continue Seroquel and trazodone    VTE Pharmacologic Prophylaxis:   Pharmacologic: Apixaban (Eliquis)  Mechanical VTE Prophylaxis in Place: Yes    Patient Centered Rounds: I have performed bedside rounds with nursing staff today     Discussions with Specialists or Other Care Team Provider:  GI    Education and Discussions with Family / Patient:  Patient    Time Spent for Care: 30 minutes  More than 50% of total time spent on counseling and coordination of care as described above  Current Length of Stay: 1 day(s)    Current Patient Status: Inpatient   Certification Statement: The patient will continue to require additional inpatient hospital stay due to Continuing rectal bleed    Discharge Plan:  Home    Code Status: Level 1 - Full Code    Additional Data:     Labs:      Results from last 7 days  Lab Units 10/28/17  0622 10/27/17  1235   WBC Thousand/uL 4 00* 4 20*   HEMOGLOBIN g/dL 10 9* 11 6*   HEMATOCRIT % 34 2* 35 6*   PLATELETS Thousands/uL 328 390   NEUTROS PCT %  --  56   LYMPHS PCT %  --  34   MONOS PCT %  --  7   EOS PCT %  --  2       Results from last 7 days  Lab Units 10/28/17  0828 10/27/17  1235   SODIUM mmol/L 134* 137   POTASSIUM mmol/L 4 8 4 6   CHLORIDE mmol/L 103 102   CO2 mmol/L 22 27   BUN mg/dL 11 14   CREATININE mg/dL 0 86 0 99   CALCIUM mg/dL 7 8* 8 7   TOTAL PROTEIN g/dL  --  8 5*   BILIRUBIN TOTAL mg/dL  --  0 30   ALK PHOS U/L  --  84   ALT U/L  --  33   AST U/L  --  40   GLUCOSE RANDOM mg/dL 120 102           * I Have Reviewed All Lab Data Listed Above  * Additional Pertinent Lab Tests Reviewed: All Labs Within Last 24 Hours Reviewed    Recent Cultures (last 7 days):           Last 24 Hours Medication List:     amLODIPine 10 mg Oral Daily   apixaban 5 mg Oral BID   dicyclomine 20 mg Oral 4x Daily (AC & HS)   metroNIDAZOLE 500 mg Oral Q8H [address]   polyethylene glycol 17 g Oral Daily   prazosin 2 mg Oral HS   QUEtiapine 100 mg Oral HS   traZODone 150 mg Oral HS        Today, Patient Was Seen By: Belen Valenzuela MD    ** Please Note: Dragon 360 Dictation voice to text software may have been used in the creation of this document   **

## 2017-10-28 NOTE — CONSULTS
Consultation - [address] [facility]  Radha Hussein 40 y o  female MRN: [id number]  Unit/Bed#: [address] [address] Encounter: [id number]        Inpatient consult to gastroenterology  Consult performed by: Geno Arroyo ordered by: Kamryn Swanson          Reason for Consult / Principal Problem:  Rectal bleeding      ASSESSMENT AND PLAN:      78-year-old female with history of ulcerative colitis status post total abdominal colectomy and J-pouch admitted with rectal bleeding x 1 day  Bleeding is likely secondary to hemorrhoids versus ulcer at the surgical anastomotic site  Clinically she does not appear to have active pouchitis  I reviewed her last colonoscopy from June which showed normal pouch  Fortunately her hemoglobin is stable   -continue to monitor hemoglobin  Advance her diet to low residue   -pain control as per primary team   -will check inflammatory markers to assess for active inflammation   -I recommend pouchoscopy on Monday for direct visualization  Enterovaginal fistula -she has no discharge at this time  Out patient follow up with your GI and colorectal surgeon   -I recommend outpatient small bowel study -MRI enterography to assess for small bowel Crohn's and  assess for complete healing of enterovaginal fistula  -Discussed with the primary team       ______________________________________________________________________    HPI:  78-year-old female  ulcerative colitis diagnosed at age 16  She was on mesalamine, steroid and remicade in the past  She failed remicade and subsequently underwent total abdominal colectomy with an ileal J-pouch reconstruction in 2013  She had pouchitis and enterovaginal fistula (dx in 2015)  Patient started having bright red blood per rectum yesterday  She noted multiple episode of bleeding with clots  She also reports  diffuse abdominal pain and nausea but no vomiting  She had no significant drop in hemoglobin    Her baseline hemoglobin is around 11  Her most recent hemoglobin is 10 9  Last pouchoscopy was done by Dr Glenn Millan on 2017 -unremarkable mucosa endoscopically  Biopsy from ileal pouch did not show any evidence of pouchitis  Biopsy from ileostomy stoma showed mild acute enteritis with villous blunting  Baseline bowel movement every 4-6 hours  She  had multiple abdominal surgeries for adhesion and small bowel obstruction from internal hernia  She was diagnosed with enterovaginal fistula since   She was briefly on TPN in the past   Currently she denies any discharge from her vagina  REVIEW OF SYSTEMS:    CONSTITUTIONAL: Denies any fever, chills, rigors, and weight loss  HEENT: No earache or tinnitus  Denies hearing loss or visual disturbances  CARDIOVASCULAR: No chest pain or palpitations  RESPIRATORY: Denies any cough, hemoptysis, shortness of breath or dyspnea on exertion  GASTROINTESTINAL: As noted in the History of Present Illness  GENITOURINARY: No problems with urination  Denies any hematuria or dysuria  NEUROLOGIC: No dizziness or vertigo, denies headaches  MUSCULOSKELETAL: Denies any muscle or joint pain  SKIN: Denies skin rashes or itching  ENDOCRINE: Denies excessive thirst  Denies intolerance to heat or cold  PSYCHOSOCIAL: Denies depression or anxiety  Denies any recent memory loss  Historical Information   Past Medical History:   Diagnosis Date    Bowel obstruction     Cancer ([facility] [address] )     ovarian    Chronic narcotic dependence (HCC)     Chronic pain     Chronic ulcerative colitis ([facility] [address] )     Colitis     Fibroids     H/O blood clots     Hypertension     IBS (irritable bowel syndrome)     Obesity (BMI 30 0-34  9)     Recurrent Pelvic fluid collection     Vaginal fistula      Past Surgical History:   Procedure Laterality Date    ABDOMINAL SURGERY      per pt "about 21"    APPENDECTOMY      BOWEL RESECTION      BOWEL RESECTION      "removed entire large bowel"     SECTION      CHOLECYSTECTOMY      COLON SURGERY      EVACUATION OF HEMATOMA      EXAMINATION UNDER ANESTHESIA N/A 8/13/2016    Procedure: EXAM UNDER ANESTHESIA (EUA)  pouch-oscopy;  Surgeon: Sai Padron MD;  [facility];  Service:     HERNIA REPAIR      ILEOSTOMY      PARACENTESIS      PARTIAL HYSTERECTOMY      MS COLONOSCOPY FLX DX W/COLLJ [facility] WHEN PFRMD N/A 6/16/2017    Procedure: COLONOSCOPY;  Surgeon: Paola Valadez MD;  Location: [facility];   Service: Colorectal    TUBAL LIGATION       Social History   History   Alcohol Use    Yes     Comment: occasional     History   Drug Use No     History   Smoking Status    Never Smoker   Smokeless Tobacco    Never Used     Family History   Problem Relation Age of Onset    Ulcerative colitis Mother     Cancer Paternal Grandfather        Meds/Allergies     Prescriptions Prior to Admission   Medication    amLODIPine (NORVASC) 5 mg tablet    apixaban (ELIQUIS) 5 mg    butalbital-acetaminophen-caffeine (FIORICET,ESGIC) -40 mg per tablet    dicyclomine (BENTYL) 20 mg tablet    ondansetron (ZOFRAN-ODT) 4 mg disintegrating tablet    prazosin (MINIPRESS) 2 mg capsule    QUEtiapine (SEROquel) 100 mg tablet    traZODone (DESYREL) 150 mg tablet     Current Facility-Administered Medications   Medication Dose Route Frequency    aluminum-magnesium hydroxide-simethicone (MYLANTA) 200-200-20 mg/5 mL oral suspension 30 mL  30 mL Oral Q6H PRN    amLODIPine (NORVASC) tablet 10 mg  10 mg Oral Daily    apixaban (ELIQUIS) tablet 5 mg  5 mg Oral BID    dicyclomine (BENTYL) tablet 20 mg  20 mg Oral 4x Daily (AC & HS)    metroNIDAZOLE (FLAGYL) tablet 500 mg  500 mg Oral Q8H [address]    morphine injection 2 mg  2 mg Intravenous Q4H PRN    ondansetron (ZOFRAN) injection 4 mg  4 mg Intravenous Q4H PRN    polyethylene glycol (MIRALAX) packet 17 g  17 g Oral Daily    prazosin (MINIPRESS) capsule 2 mg  2 mg Oral HS    QUEtiapine (SEROquel) tablet 100 mg  100 mg Oral HS    sodium chloride 0 9 % infusion  125 mL/hr Intravenous Continuous    traZODone (DESYREL) tablet 150 mg  150 mg Oral HS       Allergies   Allergen Reactions    Ceclor [Cefaclor] Anaphylaxis    Ciprofloxacin Anaphylaxis    Xarelto [Rivaroxaban] Other (See Comments)     Coughing up blood    Betadine [Povidone Iodine] Rash     Pt states washing skin with betadine causes rash    Tylox [Oxycodone-Acetaminophen] Rash     Tolerates morphine           Objective     Blood pressure 130/64, pulse 94, temperature 97 7 °F (36 5 °C), temperature source Oral, resp  rate 18, height 5' 10" (1 778 m), weight 113 kg (249 lb 1 9 oz), SpO2 94 %  Intake/Output Summary (Last 24 hours) at 10/28/17 1046  Last data filed at 10/28/17 0851   Gross per 24 hour   Intake              480 ml   Output                0 ml   Net              480 ml         PHYSICAL EXAM:      General Appearance:   Alert, cooperative, no distress   HEENT:   Normocephalic, atraumatic, anicteric      Neck:  Supple, symmetrical, trachea midline   Lungs:   Clear to auscultation bilaterally; no rales, rhonchi or wheezing; respirations unlabored    Heart[de-identified]   Regular rate and rhythm; no murmur, rub, or gallop     Abdomen:   Soft, mild diffuse tenderness, non-distended; normal bowel sounds; no masses, no organomegaly    Genitalia:   Deferred    Rectal:   Deferred    Extremities:  No cyanosis, clubbing or edema    Pulses:  2+ and symmetric all extremities    Skin:  No jaundice, rashes, or lesions    Lymph nodes:  No palpable cervical lymphadenopathy        Lab Results:   Admission on 10/27/2017   Component Date Value    WBC 10/27/2017 4 20*    RBC 10/27/2017 4 18*    Hemoglobin 10/27/2017 11 6*    Hematocrit 10/27/2017 35 6*    MCV 10/27/2017 85     MCH 10/27/2017 27 7     MCHC 10/27/2017 32 6     RDW 10/27/2017 16 4*    MPV 10/27/2017 8 6*    Platelets 25/58/0553 390     nRBC 10/27/2017 0     Neutrophils Relative 10/27/2017 56     Lymphocytes Relative 10/27/2017 34     Monocytes Relative 10/27/2017 7     Eosinophils Relative 10/27/2017 2     Basophils Relative 10/27/2017 0     Neutrophils Absolute 10/27/2017 2 40     Lymphocytes Absolute 10/27/2017 1 40     Monocytes Absolute 10/27/2017 0 30     Eosinophils Absolute 10/27/2017 0 10     Basophils Absolute 10/27/2017 0 00     Sodium 10/27/2017 137     Potassium 10/27/2017 4 6     Chloride 10/27/2017 102     CO2 10/27/2017 27     Anion Gap 10/27/2017 8     BUN 10/27/2017 14     Creatinine 10/27/2017 0 99     Glucose 10/27/2017 102     Calcium 10/27/2017 8 7     AST 10/27/2017 40     ALT 10/27/2017 33     Alkaline Phosphatase 10/27/2017 84     Total Protein 10/27/2017 8 5*    Albumin 10/27/2017 3 7     Total Bilirubin 10/27/2017 0 30     eGFR 10/27/2017 85     ABO Grouping 10/27/2017 A     Rh Factor 10/27/2017 Positive     Antibody Screen 10/27/2017 Negative     Specimen Expiration Date 10/27/2017 [id number]     Sodium 10/28/2017 134*    Potassium 10/28/2017 4 8     Chloride 10/28/2017 103     CO2 10/28/2017 22     Anion Gap 10/28/2017 9     BUN 10/28/2017 11     Creatinine 10/28/2017 0 86     Glucose 10/28/2017 120     Calcium 10/28/2017 7 8*    eGFR 10/28/2017 101     WBC 10/28/2017 4 00*    RBC 10/28/2017 4 02*    Hemoglobin 10/28/2017 10 9*    Hematocrit 10/28/2017 34 2*    MCV 10/28/2017 85     MCH 10/28/2017 27 3     MCHC 10/28/2017 32 0     RDW 10/28/2017 17 1*    Platelets 90/16/5726 328     MPV 10/28/2017 8 5*       Imaging Studies: I have personally reviewed pertinent imaging studies  CT ABDOMEN AND PELVIS WITH IV CONTRAST     INDICATION:  Rectal bleeding     COMPARISON: 10/13/2017     TECHNIQUE:  CT examination of the abdomen and pelvis was performed  Reformatted images were created in axial, sagittal, and coronal planes        Radiation dose length product (DLP) for this visit:  1270 21 mGy-cm     This examination, like all CT scans performed in the [facility], was performed utilizing techniques to minimize radiation dose exposure, including the use of   iterative reconstruction and automated exposure control      IV Contrast:  100 mL of iohexol (OMNIPAQUE)       Enteric Contrast:  Enteric contrast was not administered      FINDINGS:     ABDOMEN     LOWER CHEST:  Mild dependent changes at the lung bases      LIVER/BILIARY TREE:  Unremarkable      GALLBLADDER:  Cholecystectomy      SPLEEN:  Unremarkable      PANCREAS:  Unremarkable      ADRENAL GLANDS:  Unremarkable      KIDNEYS/URETERS:  Unremarkable  No hydronephrosis      STOMACH AND BOWEL:  A colectomy has been performed  Several bowel anastomoses are present  Moderate amount of feces proximal to the anastomosis of the small bowel with the anus        APPENDIX:  Absent      ABDOMINOPELVIC CAVITY:  No ascites or free intraperitoneal air  No lymphadenopathy      VESSELS:  Unremarkable for patient's age      PELVIS     REPRODUCTIVE ORGANS:  Hysterectomy      URINARY BLADDER:  Mild wall thickening      ABDOMINAL WALL/INGUINAL REGIONS:  Fat-containing umbilical hernia      OSSEOUS STRUCTURES:  No acute fracture or destructive osseous lesion      IMPRESSION:        1  Postoperative changes    2   Moderate amount of feces in the small bowel just proximal to an anastomosis with the rectum

## 2017-10-29 LAB
ANION GAP SERPL CALCULATED.3IONS-SCNC: 8 MMOL/L (ref 4–13)
BUN SERPL-MCNC: 9 MG/DL (ref 5–25)
CALCIUM SERPL-MCNC: 8.3 MG/DL (ref 8.3–10.1)
CHLORIDE SERPL-SCNC: 105 MMOL/L (ref 100–108)
CO2 SERPL-SCNC: 26 MMOL/L (ref 21–32)
CREAT SERPL-MCNC: 0.93 MG/DL (ref 0.6–1.3)
ERYTHROCYTE [DISTWIDTH] IN BLOOD BY AUTOMATED COUNT: 17.1 % (ref 11.6–15.1)
GFR SERPL CREATININE-BSD FRML MDRD: 91 ML/MIN/1.73SQ M
GLUCOSE SERPL-MCNC: 107 MG/DL (ref 65–140)
HCT VFR BLD AUTO: 31.8 % (ref 37–47)
HGB BLD-MCNC: 10 G/DL (ref 12–16)
MCH RBC QN AUTO: 27.1 PG (ref 27–31)
MCHC RBC AUTO-ENTMCNC: 31.5 G/DL (ref 31.4–37.4)
MCV RBC AUTO: 86 FL (ref 82–98)
MRSA NOSE QL CULT: NORMAL
PLATELET # BLD AUTO: 293 THOUSANDS/UL (ref 130–400)
PMV BLD AUTO: 8.2 FL (ref 8.9–12.7)
POTASSIUM SERPL-SCNC: 3.5 MMOL/L (ref 3.5–5.3)
RBC # BLD AUTO: 3.7 MILLION/UL (ref 4.2–5.4)
SODIUM SERPL-SCNC: 139 MMOL/L (ref 136–145)
WBC # BLD AUTO: 3.7 THOUSAND/UL (ref 4.8–10.8)

## 2017-10-29 PROCEDURE — 80048 BASIC METABOLIC PNL TOTAL CA: CPT | Performed by: INTERNAL MEDICINE

## 2017-10-29 PROCEDURE — 85027 COMPLETE CBC AUTOMATED: CPT | Performed by: INTERNAL MEDICINE

## 2017-10-29 RX ORDER — POTASSIUM CHLORIDE 20 MEQ/1
40 TABLET, EXTENDED RELEASE ORAL ONCE
Status: COMPLETED | OUTPATIENT
Start: 2017-10-29 | End: 2017-10-29

## 2017-10-29 RX ORDER — MORPHINE SULFATE 2 MG/ML
2 INJECTION, SOLUTION INTRAMUSCULAR; INTRAVENOUS
Status: DISCONTINUED | OUTPATIENT
Start: 2017-10-29 | End: 2017-10-30 | Stop reason: HOSPADM

## 2017-10-29 RX ADMIN — AMLODIPINE BESYLATE 10 MG: 10 TABLET ORAL at 09:11

## 2017-10-29 RX ADMIN — METRONIDAZOLE 500 MG: 500 TABLET ORAL at 14:43

## 2017-10-29 RX ADMIN — HYDROMORPHONE HYDROCHLORIDE 0.5 MG: 1 INJECTION, SOLUTION INTRAMUSCULAR; INTRAVENOUS; SUBCUTANEOUS at 21:28

## 2017-10-29 RX ADMIN — MORPHINE SULFATE 2 MG: 2 INJECTION, SOLUTION INTRAMUSCULAR; INTRAVENOUS at 18:42

## 2017-10-29 RX ADMIN — MORPHINE SULFATE 2 MG: 2 INJECTION, SOLUTION INTRAMUSCULAR; INTRAVENOUS at 23:24

## 2017-10-29 RX ADMIN — DICYCLOMINE HYDROCHLORIDE 20 MG: 20 TABLET ORAL at 16:07

## 2017-10-29 RX ADMIN — METRONIDAZOLE 500 MG: 500 TABLET ORAL at 05:53

## 2017-10-29 RX ADMIN — DICYCLOMINE HYDROCHLORIDE 20 MG: 20 TABLET ORAL at 21:13

## 2017-10-29 RX ADMIN — APIXABAN 5 MG: 5 TABLET, FILM COATED ORAL at 09:11

## 2017-10-29 RX ADMIN — MORPHINE SULFATE 2 MG: 2 INJECTION, SOLUTION INTRAMUSCULAR; INTRAVENOUS at 14:46

## 2017-10-29 RX ADMIN — METRONIDAZOLE 500 MG: 500 TABLET ORAL at 21:13

## 2017-10-29 RX ADMIN — QUETIAPINE FUMARATE 100 MG: 100 TABLET, FILM COATED ORAL at 21:13

## 2017-10-29 RX ADMIN — ONDANSETRON 4 MG: 2 INJECTION INTRAMUSCULAR; INTRAVENOUS at 23:24

## 2017-10-29 RX ADMIN — APIXABAN 5 MG: 5 TABLET, FILM COATED ORAL at 17:24

## 2017-10-29 RX ADMIN — PRAZOSIN HYDROCHLORIDE 2 MG: 1 CAPSULE ORAL at 21:13

## 2017-10-29 RX ADMIN — ONDANSETRON 4 MG: 2 INJECTION INTRAMUSCULAR; INTRAVENOUS at 14:46

## 2017-10-29 RX ADMIN — POTASSIUM CHLORIDE 40 MEQ: 1500 TABLET, EXTENDED RELEASE ORAL at 10:13

## 2017-10-29 RX ADMIN — TRAZODONE HYDROCHLORIDE 150 MG: 50 TABLET ORAL at 21:13

## 2017-10-29 RX ADMIN — ONDANSETRON 4 MG: 2 INJECTION INTRAMUSCULAR; INTRAVENOUS at 10:13

## 2017-10-29 RX ADMIN — MORPHINE SULFATE 2 MG: 2 INJECTION, SOLUTION INTRAMUSCULAR; INTRAVENOUS at 05:53

## 2017-10-29 RX ADMIN — DICYCLOMINE HYDROCHLORIDE 20 MG: 20 TABLET ORAL at 06:05

## 2017-10-29 RX ADMIN — ONDANSETRON 4 MG: 2 INJECTION INTRAMUSCULAR; INTRAVENOUS at 05:53

## 2017-10-29 RX ADMIN — MORPHINE SULFATE 2 MG: 2 INJECTION, SOLUTION INTRAMUSCULAR; INTRAVENOUS at 10:13

## 2017-10-29 RX ADMIN — DICYCLOMINE HYDROCHLORIDE 20 MG: 20 TABLET ORAL at 11:25

## 2017-10-29 RX ADMIN — ONDANSETRON 4 MG: 2 INJECTION INTRAMUSCULAR; INTRAVENOUS at 18:42

## 2017-10-29 NOTE — PROGRESS NOTES
Pt c/o breakthrough pain rated 7  Req more pain med  Called  for breakthrough med at 2000 and admin to pt  Pt pain relieved  Pt also receiving morphine Q4prn (req Q4-6)  When pt reported continued pain hours later, offered to call  to req more med, however also educated pt on risks assoc with frequent narcotic use  Pt reminded that her pain level is important and our goal is to decrease or cease all pain, however it may not be possible to cease it  Told pt that I want her to be comfortable and will be happy to call  if she would like  Pt stated that she will attempt to wait until next prn med due  Will continue to monitor pt pain level and tolerance and treat as indicated

## 2017-10-29 NOTE — PROGRESS NOTES
[facility] Progress Note  Patient: Radha Hussein 40 y o  female   MRN: [id number]  PCP: Mark Mejias MD  Unit/Bed#: [address] [place] [address] Encounter: [id number]  Date Of Visit: 10/29/17      Subjective:   Still with rectal bleed but it is slowing down  Abdominal pain is little better  No vomiting for sigmoidoscopy in a m  Physical Exam:     Vitals:   Temp (24hrs), Av 9 °F (36 6 °C), Min:97 2 °F (36 2 °C), Max:98 8 °F (37 1 °C)    HR:  [72-96] 72  Resp:  [18] 18  BP: (100-132)/(58-69) 124/69  SpO2:  [95 %-97 %] 95 %  Body mass index is 35 74 kg/m²  General: Alert , Ox3  H&N: Anicteric sclera, supple neck  Chest CTA BL  Heart: S1, S2 RRR  Abd: soft, Non tender, non distended  Extremities: No oedema, clubbing or cyanosis  Skin: Intact, no rash  Input and Output Summary (last 24 hours):   No intake or output data in the 24 hours ending 10/29/17 1445    Assessment:    Principal Problem:    Crohn's disease ([facility] [address] )  Active Problems:    Enterovaginal fistula    History of DVT (deep vein thrombosis)    Anxiety and depression      Plan:  Bleeding per rectum  Abdominal pain  For sigmoidoscopy in a m  Demarcus Pope Pain control with IV morphine  NPO after midnight    hemoglobin is stable   continue Flagyl        History of DVT  Continue Eliquis  Monitor CBC closely while while patient is on anticoagulation      Hypertension  Continue Minipress     Depression  Continue Seroquel and trazodone    VTE Pharmacologic Prophylaxis:   Pharmacologic: Apixaban (Eliquis)  Mechanical VTE Prophylaxis in Place: Yes    Patient Centered Rounds: I have performed bedside rounds with nursing staff today  Discussions with Specialists or Other Care Team Provider:  GI    Education and Discussions with Family / Patient:  Patient    Time Spent for Care: 30 minutes  More than 50% of total time spent on counseling and coordination of care as described above      Current Length of Stay: 2 day(s)    Current Patient Status: Inpatient Certification Statement: The patient will continue to require additional inpatient hospital stay due to Continuing rectal bleed    Discharge Plan:  Home    Code Status: Level 1 - Full Code    Additional Data:     Labs:      Results from last 7 days  Lab Units 10/29/17  0605  10/27/17  1235   WBC Thousand/uL 3 70*  < > 4 20*   HEMOGLOBIN g/dL 10 0*  < > 11 6*   HEMATOCRIT % 31 8*  < > 35 6*   PLATELETS Thousands/uL 293  < > 390   NEUTROS PCT %  --   --  56   LYMPHS PCT %  --   --  34   MONOS PCT %  --   --  7   EOS PCT %  --   --  2   < > = values in this interval not displayed  Results from last 7 days  Lab Units 10/29/17  0605  10/27/17  1235   SODIUM mmol/L 139  < > 137   POTASSIUM mmol/L 3 5  < > 4 6   CHLORIDE mmol/L 105  < > 102   CO2 mmol/L 26  < > 27   BUN mg/dL 9  < > 14   CREATININE mg/dL 0 93  < > 0 99   CALCIUM mg/dL 8 3  < > 8 7   TOTAL PROTEIN g/dL  --   --  8 5*   BILIRUBIN TOTAL mg/dL  --   --  0 30   ALK PHOS U/L  --   --  84   ALT U/L  --   --  33   AST U/L  --   --  40   GLUCOSE RANDOM mg/dL 107  < > 102   < > = values in this interval not displayed  * I Have Reviewed All Lab Data Listed Above  * Additional Pertinent Lab Tests Reviewed: All Labs Within Last 24 Hours Reviewed    Recent Cultures (last 7 days):           Last 24 Hours Medication List:     amLODIPine 10 mg Oral Daily   apixaban 5 mg Oral BID   dicyclomine 20 mg Oral 4x Daily (AC & HS)   metroNIDAZOLE 500 mg Oral Q8H [address]   polyethylene glycol 17 g Oral Daily   prazosin 2 mg Oral HS   QUEtiapine 100 mg Oral HS   traZODone 150 mg Oral HS        Today, Patient Was Seen By: Puala Baron MD    ** Please Note: Dragon 360 Dictation voice to text software may have been used in the creation of this document   **

## 2017-10-30 ENCOUNTER — ANESTHESIA EVENT (INPATIENT)
Dept: GASTROENTEROLOGY | Facility: AMBULARY SURGERY CENTER | Age: 37
DRG: 386 | End: 2017-10-30
Payer: COMMERCIAL

## 2017-10-30 VITALS
HEART RATE: 82 BPM | SYSTOLIC BLOOD PRESSURE: 125 MMHG | OXYGEN SATURATION: 98 % | HEIGHT: 70 IN | BODY MASS INDEX: 35.66 KG/M2 | TEMPERATURE: 98 F | RESPIRATION RATE: 18 BRPM | WEIGHT: 249.12 LBS | DIASTOLIC BLOOD PRESSURE: 59 MMHG

## 2017-10-30 PROBLEM — K62.5 RECTAL BLEED: Status: ACTIVE | Noted: 2017-10-27

## 2017-10-30 LAB
ANION GAP SERPL CALCULATED.3IONS-SCNC: 8 MMOL/L (ref 4–13)
BUN SERPL-MCNC: 9 MG/DL (ref 5–25)
CALCIUM SERPL-MCNC: 8.5 MG/DL (ref 8.3–10.1)
CHLORIDE SERPL-SCNC: 104 MMOL/L (ref 100–108)
CO2 SERPL-SCNC: 26 MMOL/L (ref 21–32)
CREAT SERPL-MCNC: 0.91 MG/DL (ref 0.6–1.3)
CRP SERPL QL: 12.3 MG/L
ERYTHROCYTE [DISTWIDTH] IN BLOOD BY AUTOMATED COUNT: 16.3 % (ref 11.6–15.1)
ERYTHROCYTE [SEDIMENTATION RATE] IN BLOOD: 31 MM/HOUR (ref 2–25)
GFR SERPL CREATININE-BSD FRML MDRD: 94 ML/MIN/1.73SQ M
GLUCOSE SERPL-MCNC: 104 MG/DL (ref 65–140)
HCT VFR BLD AUTO: 32.9 % (ref 37–47)
HGB BLD-MCNC: 10.4 G/DL (ref 12–16)
MCH RBC QN AUTO: 27.1 PG (ref 27–31)
MCHC RBC AUTO-ENTMCNC: 31.5 G/DL (ref 31.4–37.4)
MCV RBC AUTO: 86 FL (ref 82–98)
PLATELET # BLD AUTO: 296 THOUSANDS/UL (ref 130–400)
PMV BLD AUTO: 7.6 FL (ref 8.9–12.7)
POTASSIUM SERPL-SCNC: 4 MMOL/L (ref 3.5–5.3)
RBC # BLD AUTO: 3.82 MILLION/UL (ref 4.2–5.4)
SODIUM SERPL-SCNC: 138 MMOL/L (ref 136–145)
WBC # BLD AUTO: 3.3 THOUSAND/UL (ref 4.8–10.8)

## 2017-10-30 PROCEDURE — 85027 COMPLETE CBC AUTOMATED: CPT | Performed by: INTERNAL MEDICINE

## 2017-10-30 PROCEDURE — 0DJD8ZZ INSPECTION OF LOWER INTESTINAL TRACT, VIA NATURAL OR ARTIFICIAL OPENING ENDOSCOPIC: ICD-10-PCS | Performed by: INTERNAL MEDICINE

## 2017-10-30 PROCEDURE — 80048 BASIC METABOLIC PNL TOTAL CA: CPT | Performed by: INTERNAL MEDICINE

## 2017-10-30 PROCEDURE — 85652 RBC SED RATE AUTOMATED: CPT | Performed by: INTERNAL MEDICINE

## 2017-10-30 PROCEDURE — 86140 C-REACTIVE PROTEIN: CPT | Performed by: INTERNAL MEDICINE

## 2017-10-30 RX ORDER — PROPOFOL 10 MG/ML
INJECTION, EMULSION INTRAVENOUS AS NEEDED
Status: DISCONTINUED | OUTPATIENT
Start: 2017-10-30 | End: 2017-10-30 | Stop reason: SURG

## 2017-10-30 RX ORDER — ONDANSETRON 4 MG/1
4 TABLET, FILM COATED ORAL EVERY 8 HOURS PRN
Qty: 20 TABLET | Refills: 0 | Status: SHIPPED | OUTPATIENT
Start: 2017-10-30 | End: 2017-12-27

## 2017-10-30 RX ORDER — OXYCODONE HYDROCHLORIDE AND ACETAMINOPHEN 5; 325 MG/1; MG/1
1 TABLET ORAL EVERY 4 HOURS PRN
Qty: 15 TABLET | Refills: 0 | Status: SHIPPED | OUTPATIENT
Start: 2017-10-30 | End: 2017-11-09

## 2017-10-30 RX ORDER — HYDROCORTISONE ACETATE 25 MG/1
25 SUPPOSITORY RECTAL 2 TIMES DAILY
Status: DISCONTINUED | OUTPATIENT
Start: 2017-10-30 | End: 2017-10-30 | Stop reason: HOSPADM

## 2017-10-30 RX ORDER — HYDROCORTISONE ACETATE 25 MG/1
25 SUPPOSITORY RECTAL 2 TIMES DAILY
Qty: 12 SUPPOSITORY | Refills: 0 | Status: SHIPPED | OUTPATIENT
Start: 2017-10-30 | End: 2017-12-27

## 2017-10-30 RX ORDER — LIDOCAINE HYDROCHLORIDE 10 MG/ML
INJECTION, SOLUTION INFILTRATION; PERINEURAL AS NEEDED
Status: DISCONTINUED | OUTPATIENT
Start: 2017-10-30 | End: 2017-10-30 | Stop reason: SURG

## 2017-10-30 RX ORDER — SODIUM CHLORIDE, SODIUM LACTATE, POTASSIUM CHLORIDE, CALCIUM CHLORIDE 600; 310; 30; 20 MG/100ML; MG/100ML; MG/100ML; MG/100ML
50 INJECTION, SOLUTION INTRAVENOUS CONTINUOUS
Status: DISCONTINUED | OUTPATIENT
Start: 2017-10-30 | End: 2017-10-30 | Stop reason: HOSPADM

## 2017-10-30 RX ADMIN — APIXABAN 5 MG: 5 TABLET, FILM COATED ORAL at 17:19

## 2017-10-30 RX ADMIN — PROPOFOL 10 MG: 10 INJECTION, EMULSION INTRAVENOUS at 12:26

## 2017-10-30 RX ADMIN — DICYCLOMINE HYDROCHLORIDE 20 MG: 20 TABLET ORAL at 10:34

## 2017-10-30 RX ADMIN — MORPHINE SULFATE 2 MG: 2 INJECTION, SOLUTION INTRAMUSCULAR; INTRAVENOUS at 13:31

## 2017-10-30 RX ADMIN — HYDROMORPHONE HYDROCHLORIDE 0.5 MG: 1 INJECTION, SOLUTION INTRAMUSCULAR; INTRAVENOUS; SUBCUTANEOUS at 15:50

## 2017-10-30 RX ADMIN — METRONIDAZOLE 500 MG: 500 TABLET ORAL at 06:11

## 2017-10-30 RX ADMIN — ONDANSETRON 4 MG: 2 INJECTION INTRAMUSCULAR; INTRAVENOUS at 13:31

## 2017-10-30 RX ADMIN — DICYCLOMINE HYDROCHLORIDE 20 MG: 20 TABLET ORAL at 06:11

## 2017-10-30 RX ADMIN — HYDROMORPHONE HYDROCHLORIDE 0.5 MG: 1 INJECTION, SOLUTION INTRAMUSCULAR; INTRAVENOUS; SUBCUTANEOUS at 01:49

## 2017-10-30 RX ADMIN — MORPHINE SULFATE 2 MG: 2 INJECTION, SOLUTION INTRAMUSCULAR; INTRAVENOUS at 10:31

## 2017-10-30 RX ADMIN — PROPOFOL 60 MG: 10 INJECTION, EMULSION INTRAVENOUS at 12:24

## 2017-10-30 RX ADMIN — PROPOFOL 20 MG: 10 INJECTION, EMULSION INTRAVENOUS at 12:25

## 2017-10-30 RX ADMIN — DICYCLOMINE HYDROCHLORIDE 20 MG: 20 TABLET ORAL at 15:50

## 2017-10-30 RX ADMIN — ONDANSETRON 4 MG: 2 INJECTION INTRAMUSCULAR; INTRAVENOUS at 04:01

## 2017-10-30 RX ADMIN — PROPOFOL 10 MG: 10 INJECTION, EMULSION INTRAVENOUS at 12:31

## 2017-10-30 RX ADMIN — PROPOFOL 10 MG: 10 INJECTION, EMULSION INTRAVENOUS at 12:30

## 2017-10-30 RX ADMIN — PROPOFOL 10 MG: 10 INJECTION, EMULSION INTRAVENOUS at 12:29

## 2017-10-30 RX ADMIN — MORPHINE SULFATE 2 MG: 2 INJECTION, SOLUTION INTRAMUSCULAR; INTRAVENOUS at 04:01

## 2017-10-30 RX ADMIN — LIDOCAINE HYDROCHLORIDE 50 MG: 10 INJECTION, SOLUTION INFILTRATION; PERINEURAL at 12:24

## 2017-10-30 RX ADMIN — METRONIDAZOLE 500 MG: 500 TABLET ORAL at 13:31

## 2017-10-30 NOTE — CASE MANAGEMENT
Continued Stay Review    Date: 10/28/17    Vitals:   Temp (24hrs), Av 8 °F (36 6 °C), Min:97 7 °F (36 5 °C), Max:98 °F (36 7 °C)     HR:  [] 84  Resp:  [18] 18  BP: (129-138)/(63-85) 129/76  SpO2:  [94 %-100 %] 94 %  Body mass index is 35 74 kg/m²  Last 24 Hours Medication List:      amLODIPine 10 mg Oral Daily   apixaban 5 mg Oral BID   dicyclomine 20 mg Oral 4x Daily (AC & HS)   metroNIDAZOLE 500 mg Oral Q8H [address]   polyethylene glycol 17 g Oral Daily   prazosin 2 mg Oral HS   QUEtiapine 100 mg Oral HS   traZODone 150 mg Oral HS    IV ZOFRAN X5 DOSES  IV MORPHINE X5 DOSES  IV DILAUDID X2    IV NS@ 125 HR    Abnormal Labs/Diagnostic Results: WBC 4 0 H/H  10 9/34,  CA 7 8     Age/Sex: 40 y o  female     PER GI  ASSESSMENT AND PLAN:     43-year-old female with history of ulcerative colitis status post total abdominal colectomy and J-pouch admitted with rectal bleeding x 1 day  Bleeding is likely secondary to hemorrhoids versus ulcer at the surgical anastomotic site  Clinically she does not appear to have active pouchitis  I reviewed her last colonoscopy from  which showed normal pouch  Fortunately her hemoglobin is stable   -continue to monitor hemoglobin  Advance her diet to low residue   -pain control as per primary team   -will check inflammatory markers to assess for active inflammation   -I recommend pouchoscopy on Monday for direct visualization  Enterovaginal fistula -she has no discharge at this time  Out patient follow up with your GI and colorectal surgeon   -I recommend outpatient small bowel study -MRI enterography to assess for small bowel Crohn's and  assess for complete healing of enterovaginal fistula  -Discussed with the primary team     PER MD  Patient is still having rectal bleed  Still with abdominal pain  Dry heaving but no vomiting  Afebrile  Denies any chest pain, SOB, coughing or palpitations     Principal Problem:    Crohn's disease ([facility] [address] )  Active Problems: Enterovaginal fistula    History of DVT (deep vein thrombosis)    Anxiety and depression        Plan:  Bleeding per rectum  Abdominal pain  Patient is status post colectomy  She will be going for sigmoidoscopy to rule out hemorrhoids and evaluate the surgical powder check on Monday  Pain control with IV morphine    continue clear liquid diet    hemoglobin is stable   continue Flagyl     History of DVT  Continue Eliquis   Monitor CBC closely while while patient is on anticoagulation    Hypertension  Continue Minipress  Depression  Continue Seroquel and trazodone  VTE Pharmacologic Prophylaxis:   Pharmacologic: Apixaban (Eliquis)  Mechanical VTE Prophylaxis in Place: Yes   Certification Statement: The patient will continue to require additional inpatient hospital stay due to Continuing rectal bleed

## 2017-10-30 NOTE — OP NOTE
COLONOSCOPY    PROCEDURE: flex sig    INDICATIONS: Rectal Bleeding    POST-OP DIAGNOSIS: See the impression below    SEDATION: Monitored anesthesia care, check anesthesia records    PHYSICAL EXAM:    /92   Pulse 78   Temp 97 8 °F (36 6 °C) (Tympanic)   Resp 18   Ht 5' 10" (1 778 m)   Wt 113 kg (249 lb 1 9 oz)   SpO2 98%   BMI 35 74 kg/m²   Body mass index is 35 74 kg/m²  General: NAD  Heart: S1 & S2 normal, RRR  Lungs: CTA, No rales or rhonchi  Abdomen: Soft, nontender, nondistended, good bowel sounds    CONSENT:  Informed consent was obtained for the procedure, including sedation after explaining the risks and benefits of the procedure  Risks including but not limited to bleeding, perforation, infection, aspiration were discussed in detail  Also explained about less than 100%$ sensitivity with the exam and other alternatives  PREPARATION:   EKG tracing, pulse oximetry, blood pressure were monitored throughout the procedure  Patient was identified by myself both verbally and by visual inspection of ID band  DESCRIPTION:   Patient was placed in the left lateral decubitus position and was sedated with the above medication  Digital rectal examination was performed  The colonoscope was introduced in to the anal canal and advanced up to a distance of 40 cm  A careful inspection was made as the colonoscope was withdrawn, including a retroflexed view of the rectum; findings and interventions are described below  Appropriate photodocumentation was obtained  The quality of the colonic preparation was poor  FINDINGS:    Poor prep  Mild inflammation at ileo-anal anastomosis at the sutures only    Limited views of the pouch demonstrated normal appearing mucosa  Normal ileum  Internal hemorrhoids on retroflexion         IMPRESSIONS:      Mild inflammation at suture line  Otherwise normal ileum and J pouch with brown stool in the pouch, limiting thorough evaluation  Internal/external hemorrhoids    RECOMMENDATIONS:    Return to floor  anusol suppositories  Resume diet  Watch for continued bleeding  Follow up with outpatient colorectal surgeon/gastroenterologist    COMPLICATIONS:  None; patient tolerated the procedure well      DISPOSITION: PACU           CONDITION: Stable

## 2017-10-30 NOTE — CASE MANAGEMENT
Initial Clinical Review    Admission: Date/Time/Statement: 10/27/17 @ 1501     Orders Placed This Encounter   Procedures    Inpatient Admission (expected length of stay for this patient is greater than two midnights)     Standing Status:   Standing     Number of Occurrences:   1     Order Specific Question:   Admitting Physician     Answer:   Hemanth Guerra [[id number]]     Order Specific Question:   Level of Care     Answer:   Med Surg [16]     Order Specific Question:   Estimated length of stay     Answer:   More than 2 Midnights     Order Specific Question:   Certification     Answer:   I certify that inpatient services are medically necessary for this patient for a duration of greater than two midnights  See H&P and MD Progress Notes for additional information about the patient's course of treatment  ED: Date/Time/Mode of Arrival:   ED Arrival Information     Expected Arrival Acuity Means of Arrival Escorted By Service Admission Type    - 10/27/2017 11:49 Urgent Ambulance Other General Medicine Urgent    Arrival Complaint    rectal bleeding          Chief Complaint:   Chief Complaint   Patient presents with    Rectal Bleeding     pt reports hx of rectal bleeding - describes large amount today sent by PMD    Abdominal Pain     sx started this am       History of Illness: 40 y o  female who presents with history of Crohn's disease with anti Ro vaginal fistula  Patient presented to the ER as she was pouring out blood from her rectum while she was having a shower  Patient stated that her blood is fresh red with blood clots in it  She also complained of increasing abdominal pain that is typical with her Crohn's disease exacerbation  Patient vomited twice but no blood with her vomitus  In the ER, patient had a CT scan of the abdomen and pelvis that showed constipation with no obvious colitis  Patient will be admitted for further management  Patient stated that she had fever at home of 101 2    Rectal exam shows guaiac positive stool  There is gross red blood on the patient's buttock around the rectum a rectal exam showed no internal external hemorrhoids and had bright red blood  ED Vital Signs:   ED Triage Vitals [10/27/17 1157]   Temperature Pulse Respirations Blood Pressure SpO2   98 °F (36 7 °C) 91 18 132/82 97 %      Temp Source Heart Rate Source Patient Position - Orthostatic VS BP Location FiO2 (%)   Oral Monitor Lying Left arm --      Pain Score       8        Wt Readings from Last 1 Encounters:   10/27/17 113 kg (249 lb 1 9 oz)       Abnormal Labs/Diagnostic Test Results:   WBC 4 20 (L) Thousand/uL           RBC 4 18 (L) Million/uL         Hemoglobin 11 6 (L) g/dL         Hematocrit 35 6 (L) %      CT abdomen pelvis with contrast   Final Result by Jose Carlos Bliss MD (10/27 1412)   1  Postoperative changes  2   Moderate amount of feces in the small bowel just proximal to an anastomosis with the rectum  ED Treatment:   Medication Administration from 10/27/2017 1149 to 10/27/2017 1643       Date/Time Order Dose Route Action Action by Comments     10/27/2017 1246 sodium chloride 0 9 % bolus 1,000 mL 1,000 mL Intravenous [address], RN      10/27/2017 1347 iohexol (OMNIPAQUE) 350 MG/ML injection (MULTI-DOSE) 100 mL 100 mL Intravenous Given Kait L Syliva [place]      10/27/2017 1452 ondansetron (ZOFRAN) injection 4 mg 4 mg Intravenous Given Tim Ngo RN      10/27/2017 1453 morphine (PF) 4 mg/mL injection 4 mg 4 mg Intravenous Given Tim Ngo RN           Past Medical/Surgical History:    Active Ambulatory Problems     Diagnosis Date Noted    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    History of DVT (deep vein thrombosis) 08/15/2016    Anxiety and depression 08/15/2016    Chronic, continuous use of opioids 08/15/2016    Intra-abdominal fluid collection 08/15/2016    Acute bilateral lower abdominal pain 08/15/2016    Crohn's disease ([facility] [address] ) 06/14/2017     Resolved Ambulatory Problems Diagnosis Date Noted    No Resolved Ambulatory Problems     Past Medical History:   Diagnosis Date    Bowel obstruction     Cancer ([facility] )     Chronic narcotic dependence (HCC)     Chronic pain     Chronic ulcerative colitis ([place] [address] )     Colitis     Fibroids     H/O blood clots     Hypertension     IBS (irritable bowel syndrome)     Obesity (BMI 30 0-34  9)     Recurrent Pelvic fluid collection     Vaginal fistula        Admitting Diagnosis: Rectal bleeding [K62 5]  Rectal bleed [K62 5]    Age/Sex: 40 y o  female    PER MD  Hospital Problem List:   Principal Problem:    Crohn's disease ([place] [address] )  Active Problems:    Enterovaginal fistula    History of DVT (deep vein thrombosis)    Anxiety and depression  Plan for the Primary Problem(s):  Bleeding per rectum  Crohn's disease exacerbation  Abdominal pain  Pain control with IV morphine  Will keep patient on clear liquid diet  H&H q 12 hours  Transfuse if needed  Currently hemoglobin and vital signs are stable  Will start patient empirically on is Augmentin and Flagyl  Patient is allergic to ciprofloxacin  Will consult GI for further recommendations  History of DVT  Continue Eliquis  Monitor CBC closely while while patient is on anticoagulation  Hypertension  Continue Minipress  Depression  Continue Seroquel and trazodone   VTE Prophylaxis: Apixaban (Eliquis)  / sequential compression device   Code Status: FC  Anticipated Length of Stay:  Patient will be admitted on an Inpatient basis with an anticipated length of stay of  > 2 midnights     Justification for Hospital Stay:  Bleeding per rectum      Admission Orders:  CBC DIFF BMP   TYPE AND SCREEN  MRSA SCREEN  MIRALAX 17G  CONSULT ABRAHAM [email or web address]  IV MORPHINE X2   IV ZOFRAN X2  Scheduled Meds:   amLODIPine 10 mg Oral Daily   apixaban 5 mg Oral BID   dicyclomine 20 mg Oral 4x Daily (AC & HS)   hydrocortisone 25 mg Rectal BID   metroNIDAZOLE 500 mg Oral Q8H [address]   polyethylene glycol 17 g Oral Daily prazosin 2 mg Oral HS   QUEtiapine 100 mg Oral HS   traZODone 150 mg Oral HS     Continuous Infusions:   PRN Meds:   aluminum-magnesium hydroxide-simethicone    HYDROmorphone    morphine injection    ondansetron

## 2017-10-30 NOTE — PLAN OF CARE
Problem: DISCHARGE PLANNING - CARE MANAGEMENT  Goal: Discharge to post-acute care or home with appropriate resources  INTERVENTIONS:  - Conduct assessment to determine patient/family and health care team treatment goals, and need for post-acute services based on payer coverage, community resources, and patient preferences, and barriers to discharge  - Address psychosocial, clinical, and financial barriers to discharge as identified in assessment in conjunction with the patient/family and health care team  - Arrange appropriate level of post-acute services according to patient's   needs and preference and payer coverage in collaboration with the physician and health care team  - Communicate with and update the patient/family, physician, and health care team regarding progress on the discharge plan  - Arrange appropriate transportation to post-acute venues   [address]  Outcome: Progressing

## 2017-10-30 NOTE — ANESTHESIA POSTPROCEDURE EVALUATION
Post-Op Assessment Note      CV Status:  Stable    Mental Status:  Alert and awake    Hydration Status:  Euvolemic    PONV Controlled:  Controlled    Airway Patency:  Patent    Post Op Vitals Reviewed: Yes          Staff: Anesthesiologist           /69 (10/30/17 1236)    Temp      Pulse 78 (10/30/17 1236)   Resp 18 (10/30/17 1236)    SpO2

## 2017-10-30 NOTE — PLAN OF CARE
DISCHARGE PLANNING     Discharge to home or other facility with appropriate resources Progressing        DISCHARGE PLANNING - CARE MANAGEMENT     Discharge to post-acute care or home with appropriate resources Progressing        Knowledge Deficit     Patient/family/caregiver demonstrates understanding of disease process, treatment plan, medications, and discharge instructions Progressing        Nutrition/Hydration-ADULT     Nutrient/Hydration intake appropriate for improving, restoring or maintaining nutritional needs Progressing        PAIN - ADULT     Verbalizes/displays adequate comfort level or baseline comfort level Progressing        SAFETY ADULT     Patient will remain free of falls Progressing     Maintain or return to baseline ADL function Progressing     Maintain or return mobility status to optimal level Progressing

## 2017-10-30 NOTE — CASE MANAGEMENT
Continued Stay Review    Date: 10/29/17    Vitals:   Temp (24hrs), Av 9 °F (36 6 °C), Min:97 2 °F (36 2 °C), Max:98 8 °F (37 1 °C)     HR:  [72-96] 72  Resp:  [18] 18  BP: (100-132)/(58-69) 124/69  SpO2:  [95 %-97 %] 95 %  Body mass index is 35 74 kg/m²         IV MORPHINE X 5 DOSES  IV ZOFRAN X5 DOSES  Medications:   Scheduled Meds:   amLODIPine 10 mg Oral Daily   apixaban 5 mg Oral BID   dicyclomine 20 mg Oral 4x Daily (AC & HS)   hydrocortisone 25 mg Rectal BID   metroNIDAZOLE 500 mg Oral Q8H [facility]   polyethylene glycol 17 g Oral Daily   prazosin 2 mg Oral HS   QUEtiapine 100 mg Oral HS   traZODone 150 mg Oral HS     Continuous Infusions:   lactated ringers 50 mL/hr     PRN Meds:   aluminum-magnesium hydroxide-simethicone    HYDROmorphone    morphine injection    ondansetron    Abnormal Labs/Diagnostic Results:   WC 3 70 H/H 10/31 8   Age/Sex: 40 y o  female     PER NURSING  Pt c/o breakthrough pain rated 7  Req more pain med  Called Dr for breakthrough med at  and admin to pt  Pt pain relieved  Pt also receiving morphine Q4prn (req Q4-6)  PER MD  Still with rectal bleed but it is slowing down  Principal Problem:    Crohn's disease ([facility] [address] )  Active Problems:    Enterovaginal fistula    History of DVT (deep vein thrombosis)    Anxiety and depression  Plan:  Bleeding per rectum  Abdominal pain  For sigmoidoscopy in a   Megan Loaiza Pain control with IV morphine    NPO after midnight       continue Flagyl      History of DVT  Continue Eliquis   Monitor CBC closely while while patient is on anticoagulation    Hypertension  Continue Minipress   Depression  Continue Seroquel and trazodone   VTE Pharmacologic Prophylaxis:   Pharmacologic: Apixaban (Eliquis)  Mechanical VTE Prophylaxis in Place: Yes  Certification Statement: The patient will continue to require additional inpatient hospital stay due to Continuing rectal bleed

## 2017-10-30 NOTE — ANESTHESIA PREPROCEDURE EVALUATION
Review of Systems/Medical History  Patient summary reviewed    No history of anesthetic complications     Cardiovascular  Exercise tolerance: good,  Hypertension controlled, No SIM, DVT  PE,  Pulmonary  Negative pulmonary ROS Not a smoker , No shortness of breath, ,        GI/Hepatic      Comment: ulcerative colitis  S/p multiple procedures of bowel  Enterovaginal fistula     Negative  ROS        Endo/Other  Negative endo/other ROS      GYN       Hematology  Negative hematology ROS      Musculoskeletal  Obesity ,   Comment: BMI 34      Neurology  Negative neurology ROS      Psychology   Depression , being treated for depression,   Comment: Chronic opioid dependence         Physical Exam    Airway    Mallampati score: II  TM Distance: >3 FB       Dental   Comment: Partial top/bottom dentures,     Cardiovascular  Cardiovascular exam normal    Pulmonary  Pulmonary exam normal Breath sounds clear to auscultation,     Other Findings        Anesthesia Plan  ASA Score- 3       Anesthesia Type- IV sedation with anesthesia with ASA Monitors  Additional Monitors:   Airway Plan:           Induction- intravenous  Informed Consent- Anesthetic plan and risks discussed with patient

## 2017-10-30 NOTE — NURSING NOTE
Patient left ambulatory in stable condition  All medications and instructions were explained  Patient states understanding

## 2017-10-30 NOTE — DISCHARGE SUMMARY
Discharge Summary - [facility] Internal Medicine    Patient Information: Raul Carpio 40 y o  female MRN: [id number]  Unit/Bed#: [address] [facility] Encounter: [id number]    Discharging Physician / Practitioner: Romaine Watters MD  PCP: Elias Valdovinos MD  Admission Date: 10/27/2017  Discharge Date: 10/30/17    Reason for Admission:  Bleeding per rectum    Discharge Diagnoses:     Principal Problem:    Rectal bleed  Active Problems:    Enterovaginal fistula    History of DVT (deep vein thrombosis)    Anxiety and depression    Crohn's disease ([facility] [address] )  Resolved Problems:    * No resolved hospital problems  *      Consultations During Hospital Stay:  · GI    Procedures Performed:     · Sigmoidoscopy    Significant Findings / Test Results:     · See below    Incidental Findings:   · None     Test Results Pending at Discharge (will require follow up): · None     Outpatient Tests Requested:  · None    Complications:  None    Hospital Course:     Raul Carpio is a 40 y o  female who presents with history of Crohn's disease with entero- vaginal fistula  Patient presented to the ER as she was pouring out blood from her rectum while she was having a shower  Patient stated that her blood is fresh red with blood clots in it  She also complained of increasing abdominal pain that is typical with her Crohn's disease exacerbation  Patient vomited twice but no blood with her vomitus  In the ER, patient had a CT scan of the abdomen and pelvis that showed constipation with no obvious colitis  Patient was admitted for further management  Initially, patient continued to have bleeding but her hemoglobin stayed stable  Symptomatic treatment of her abdominal pain with IV narcotics and her vomiting with p r n  Zofran  Her vomiting resolved  Initially she was put on clear liquid diet then diet was ad advanced to a low-fiber diet  Today, patient went for sigmoidoscopy that came back showing: Mild inflammation at suture line  Otherwise normal ileum and J pouch with brown stool in the pouch, limiting thorough evaluation  Internal/external hemorrhoids  Patient was started on Anusol suppositories per GI recommendations  Patient will be discharged home with prescriptions for Zofran, Percocet, and Anusol suppositories  Patient bleeding completely resolved before she was discharged from the hospital   She continued to have some residual mild abdominal pain    Condition at Discharge: stable     Discharge Day Visit / Exam:     Subjective:  Abdominal pain, no vomiting, no bleeding per rectum  Vitals: Blood Pressure: 125/59 (10/30/17 1628)  Pulse: 82 (10/30/17 1628)  Temperature: 98 °F (36 7 °C) (10/30/17 1628)  Temp Source: Tympanic (10/30/17 1211)  Respirations: 18 (10/30/17 1628)  Height: 5' 10" (177 8 cm) (10/27/17 1644)  Weight - Scale: 113 kg (249 lb 1 9 oz) (10/27/17 1644)  SpO2: 98 % (10/30/17 1628)  Exam:   Physical Exam     General: Alert , Ox3  H&N: Anicteric sclera, supple neck  Chest CTA BL  Heart: S1, S2 RRR  Abd: soft, mild generalized tenderness  Extremities: No oedema, clubbing or cyanosis  Skin: Intact, no rash  Discharge instructions/Information to patient and family:   See after visit summary for information provided to patient and family  Provisions for Follow-Up Care:  See after visit summary for information related to follow-up care and any pertinent home health orders  Disposition:     Home    For Discharges to [facility]:   · Not Applicable to this Patient - Not Applicable to this Patient    Planned Readmission: no     Discharge Statement:  I spent 35 minutes discharging the patient  This time was spent on the day of discharge  I had direct contact with the patient on the day of discharge  Greater than 50% of the total time was spent examining patient, answering all patient questions, arranging and discussing plan of care with patient as well as directly providing post-discharge instructions  Additional time then spent on discharge activities  Discharge Medications:  See after visit summary for reconciled discharge medications provided to patient and family        ** Please Note: This note has been constructed using a voice recognition system **

## 2017-10-30 NOTE — CASE MANAGEMENT
Continued Stay Review    Date: 10/30/17    Vital Signs: /73   Pulse 71   Temp 97 8 °F (36 6 °C) (Tympanic)   Resp 18   Ht 5' 10" (1 778 m)   Wt 113 kg (249 lb 1 9 oz)   SpO2 98%   BMI 35 74 kg/m²       NPO  FLEX SIGMOID COLON  IV MORPHINE X3   IV ZOFRAN X3  Medications:   Scheduled Meds:   amLODIPine 10 mg Oral Daily   apixaban 5 mg Oral BID   dicyclomine 20 mg Oral 4x Daily (AC & HS)   hydrocortisone 25 mg Rectal BID   metroNIDAZOLE 500 mg Oral Q8H [facility]   polyethylene glycol 17 g Oral Daily   prazosin 2 mg Oral HS   QUEtiapine 100 mg Oral HS   traZODone 150 mg Oral HS     Continuous Infusions:   lactated ringers 50 mL/hr     PRN Meds:   aluminum-magnesium hydroxide-simethicone    HYDROmorphone    morphine injection    ondansetron    Abnormal Labs/Diagnostic Results:     Age/Sex: 40 y o  female     COLONOSCOPY FLEX SIGMOID  IMPRESSIONS:    Mild inflammation at suture line  Otherwise normal ileum and J pouch with brown stool in the pouch, limiting thorough evaluation  Internal/external hemorrhoids  RECOMMENDATIONS:  Return to floor  anusol suppositories  Resume diet  Watch for continued bleeding  Follow up with outpatient colorectal surgeon/gastroenterologist

## 2017-12-27 ENCOUNTER — APPOINTMENT (EMERGENCY)
Dept: RADIOLOGY | Facility: HOSPITAL | Age: 37
End: 2017-12-27
Payer: COMMERCIAL

## 2017-12-27 ENCOUNTER — HOSPITAL ENCOUNTER (EMERGENCY)
Facility: HOSPITAL | Age: 37
Discharge: HOME | End: 2017-12-27
Attending: EMERGENCY MEDICINE | Admitting: EMERGENCY MEDICINE
Payer: COMMERCIAL

## 2017-12-27 VITALS
OXYGEN SATURATION: 98 % | TEMPERATURE: 99.1 F | HEART RATE: 70 BPM | BODY MASS INDEX: 38.65 KG/M2 | HEIGHT: 70 IN | WEIGHT: 270 LBS | DIASTOLIC BLOOD PRESSURE: 72 MMHG | RESPIRATION RATE: 18 BRPM | SYSTOLIC BLOOD PRESSURE: 164 MMHG

## 2017-12-27 DIAGNOSIS — R07.89 CHEST DISCOMFORT: Primary | ICD-10-CM

## 2017-12-27 LAB
ANION GAP SERPL CALCULATED.3IONS-SCNC: 7 MMOL/L (ref 4–13)
ATRIAL RATE: 81 BPM
BASOPHILS # BLD AUTO: 0.1 THOUSANDS/ΜL (ref 0–0.1)
BASOPHILS NFR BLD AUTO: 1 % (ref 0–1)
BUN SERPL-MCNC: 12 MG/DL (ref 5–25)
CALCIUM SERPL-MCNC: 8.7 MG/DL (ref 8.3–10.1)
CHLORIDE SERPL-SCNC: 104 MMOL/L (ref 100–108)
CO2 SERPL-SCNC: 28 MMOL/L (ref 21–32)
CREAT SERPL-MCNC: 0.84 MG/DL (ref 0.6–1.3)
EOSINOPHIL # BLD AUTO: 0.1 THOUSAND/ΜL (ref 0–0.61)
EOSINOPHIL NFR BLD AUTO: 2 % (ref 0–6)
ERYTHROCYTE [DISTWIDTH] IN BLOOD BY AUTOMATED COUNT: 15.7 % (ref 11.6–15.1)
GFR SERPL CREATININE-BSD FRML MDRD: 103 ML/MIN/1.73SQ M
GLUCOSE SERPL-MCNC: 95 MG/DL (ref 65–140)
HCT VFR BLD AUTO: 35.6 % (ref 37–47)
HGB BLD-MCNC: 11.7 G/DL (ref 12–16)
LIPASE SERPL-CCNC: 162 U/L (ref 73–393)
LYMPHOCYTES # BLD AUTO: 2 THOUSANDS/ΜL (ref 0.6–4.47)
LYMPHOCYTES NFR BLD AUTO: 31 % (ref 14–44)
MCH RBC QN AUTO: 27.3 PG (ref 27–31)
MCHC RBC AUTO-ENTMCNC: 32.8 G/DL (ref 31.4–37.4)
MCV RBC AUTO: 83 FL (ref 82–98)
MONOCYTES # BLD AUTO: 0.3 THOUSAND/ΜL (ref 0.17–1.22)
MONOCYTES NFR BLD AUTO: 5 % (ref 4–12)
NEUTROPHILS # BLD AUTO: 3.8 THOUSANDS/ΜL (ref 1.85–7.62)
NEUTS SEG NFR BLD AUTO: 61 % (ref 43–75)
P AXIS: 52 DEGREES
PLATELET # BLD AUTO: 418 THOUSANDS/UL (ref 130–400)
PMV BLD AUTO: 8.3 FL (ref 8.9–12.7)
POTASSIUM SERPL-SCNC: 4.9 MMOL/L (ref 3.5–5.3)
PR INTERVAL: 148 MS
QRS AXIS: 6 DEGREES
QRSD INTERVAL: 76 MS
QT INTERVAL: 362 MS
QTC INTERVAL: 420 MS
RBC # BLD AUTO: 4.27 MILLION/UL (ref 4.2–5.4)
SODIUM SERPL-SCNC: 139 MMOL/L (ref 136–145)
T WAVE AXIS: 31 DEGREES
TROPONIN I SERPL-MCNC: <0.02 NG/ML
VENTRICULAR RATE: 81 BPM
WBC # BLD AUTO: 6.3 THOUSAND/UL (ref 4.8–10.8)

## 2017-12-27 PROCEDURE — 80048 BASIC METABOLIC PNL TOTAL CA: CPT | Performed by: EMERGENCY MEDICINE

## 2017-12-27 PROCEDURE — 96374 THER/PROPH/DIAG INJ IV PUSH: CPT

## 2017-12-27 PROCEDURE — 36415 COLL VENOUS BLD VENIPUNCTURE: CPT | Performed by: EMERGENCY MEDICINE

## 2017-12-27 PROCEDURE — 71020 HB CHEST X-RAY 2VW FRONTAL&LATL: CPT

## 2017-12-27 PROCEDURE — 93005 ELECTROCARDIOGRAM TRACING: CPT | Performed by: EMERGENCY MEDICINE

## 2017-12-27 PROCEDURE — 83690 ASSAY OF LIPASE: CPT | Performed by: EMERGENCY MEDICINE

## 2017-12-27 PROCEDURE — 96375 TX/PRO/DX INJ NEW DRUG ADDON: CPT

## 2017-12-27 PROCEDURE — 99285 EMERGENCY DEPT VISIT HI MDM: CPT

## 2017-12-27 PROCEDURE — 85025 COMPLETE CBC W/AUTO DIFF WBC: CPT | Performed by: EMERGENCY MEDICINE

## 2017-12-27 PROCEDURE — 71275 CT ANGIOGRAPHY CHEST: CPT

## 2017-12-27 PROCEDURE — 93005 ELECTROCARDIOGRAM TRACING: CPT

## 2017-12-27 PROCEDURE — 84484 ASSAY OF TROPONIN QUANT: CPT | Performed by: EMERGENCY MEDICINE

## 2017-12-27 RX ORDER — ASPIRIN 81 MG/1
324 TABLET, CHEWABLE ORAL ONCE
Status: COMPLETED | OUTPATIENT
Start: 2017-12-27 | End: 2017-12-27

## 2017-12-27 RX ORDER — KETOROLAC TROMETHAMINE 30 MG/ML
30 INJECTION, SOLUTION INTRAMUSCULAR; INTRAVENOUS ONCE
Status: COMPLETED | OUTPATIENT
Start: 2017-12-27 | End: 2017-12-27

## 2017-12-27 RX ORDER — MORPHINE SULFATE 15 MG/1
15 TABLET ORAL EVERY 4 HOURS PRN
Qty: 10 TABLET | Refills: 0 | Status: SHIPPED | OUTPATIENT
Start: 2017-12-27

## 2017-12-27 RX ORDER — LIDOCAINE 50 MG/G
1 PATCH TOPICAL DAILY
Status: DISCONTINUED | OUTPATIENT
Start: 2017-12-28 | End: 2017-12-27

## 2017-12-27 RX ORDER — MORPHINE SULFATE 4 MG/ML
4 INJECTION, SOLUTION INTRAMUSCULAR; INTRAVENOUS ONCE
Status: COMPLETED | OUTPATIENT
Start: 2017-12-27 | End: 2017-12-27

## 2017-12-27 RX ORDER — LIDOCAINE 50 MG/G
1 PATCH TOPICAL ONCE
Status: DISCONTINUED | OUTPATIENT
Start: 2017-12-27 | End: 2017-12-28 | Stop reason: HOSPADM

## 2017-12-27 RX ORDER — 0.9 % SODIUM CHLORIDE 0.9 %
3 VIAL (ML) INJECTION AS NEEDED
Status: DISCONTINUED | OUTPATIENT
Start: 2017-12-27 | End: 2017-12-28 | Stop reason: HOSPADM

## 2017-12-27 RX ORDER — LIDOCAINE 50 MG/G
1 PATCH TOPICAL DAILY
Qty: 30 PATCH | Refills: 0 | Status: SHIPPED | OUTPATIENT
Start: 2017-12-28

## 2017-12-27 RX ADMIN — KETOROLAC TROMETHAMINE 30 MG: 30 INJECTION, SOLUTION INTRAMUSCULAR at 21:55

## 2017-12-27 RX ADMIN — MORPHINE SULFATE 4 MG: 4 INJECTION, SOLUTION INTRAMUSCULAR; INTRAVENOUS at 20:13

## 2017-12-27 RX ADMIN — LIDOCAINE 1 PATCH: 50 PATCH CUTANEOUS at 21:52

## 2017-12-27 RX ADMIN — ASPIRIN 81 MG 324 MG: 81 TABLET ORAL at 20:13

## 2017-12-27 RX ADMIN — IOHEXOL 85 ML: 350 INJECTION, SOLUTION INTRAVENOUS at 20:34

## 2017-12-28 NOTE — DISCHARGE INSTRUCTIONS

## 2017-12-28 NOTE — ED PROVIDER NOTES
History  Chief Complaint   Patient presents with    Chest Pain     pt c/o chest pain radiating to back on right side since yesterday  History provided by:  Patient  Chest Pain   Pain location:  R chest  Pain quality: sharp and shooting    Pain radiates to:  Does not radiate  Pain severity:  Mild  Onset quality:  Gradual  Duration:  2 days  Timing:  Constant  Progression:  Worsening  Chronicity:  New  Context: breathing    Relieved by:  Nothing  Worsened by:  Nothing tried  Ineffective treatments:  None tried  Associated symptoms: no abdominal pain, no cough, no dizziness, no dysphagia, no fever, no headache, no nausea, no numbness, no shortness of breath and no weakness    Associated symptoms comment:  Patient has a history of protein CNS deficiency  Noted with increased clot formation in the past   Currently Eliquis for prevention  Noted with chest pain that started in the right side of the chest over last 24-48 hours  Worsening with palpation  Radiation to the back  No shortness of breath  Risk factors: prior DVT/PE        Prior to Admission Medications   Prescriptions Last Dose Informant Patient Reported? Taking? amLODIPine (NORVASC) 5 mg tablet   Yes No   Sig: Take 10 mg by mouth daily     apixaban (ELIQUIS) 5 mg   Yes No   Sig: Take 5 mg by mouth 2 (two) times a day   butalbital-acetaminophen-caffeine (FIORICET,ESGIC) -40 mg per tablet   Yes No   Sig: Take 1 tablet by mouth as needed for headaches     dicyclomine (BENTYL) 20 mg tablet   Yes No   Sig: Take 20 mg by mouth as needed        Facility-Administered Medications: None       Past Medical History:   Diagnosis Date    Bowel obstruction     Cancer ([facility] )     ovarian    Chronic narcotic dependence (HCC)     Chronic pain     abdominal    Chronic ulcerative colitis ([facility] )     Colitis     Fibroids     H/O blood clots     Hypertension     IBS (irritable bowel syndrome)     Obesity (BMI 30 0-34  9)     Recurrent Pelvic fluid collection     Vaginal fistula        Past Surgical History:   Procedure Laterality Date    ABDOMINAL SURGERY      per pt "about 20"    APPENDECTOMY      BOWEL RESECTION      BOWEL RESECTION      "removed entire large bowel"     SECTION      CHOLECYSTECTOMY      COLON SURGERY      EVACUATION OF HEMATOMA      EXAMINATION UNDER ANESTHESIA N/A 2016    Procedure: EXAM UNDER ANESTHESIA (EUA)  pouch-oscopy;  Surgeon: Jose Juan Singh MD;  [facility];  Service:     HERNIA REPAIR      ILEOSTOMY      PARACENTESIS      PARTIAL HYSTERECTOMY      MD COLONOSCOPY FLX DX W/COLLJ [facility] WHEN PFRMD N/A 2017    Procedure: COLONOSCOPY;  Surgeon: Lara Cope MD;  [facility]; Service: Colorectal    MD SIGMOIDOSCOPY,DIAGNOSTIC N/A 10/30/2017    Procedure: Genaro Galdameze;  Surgeon: Cher Patel MD;  Location: [place] [address] GI LAB; Service: Gastroenterology    TUBAL LIGATION         Family History   Problem Relation Age of Onset    Ulcerative colitis Mother     Cancer Paternal Grandfather      I have reviewed and agree with the history as documented  Social History   Substance Use Topics    Smoking status: Never Smoker    Smokeless tobacco: Never Used    Alcohol use Yes      Comment: occasional        Review of Systems   Constitutional: Negative for chills and fever  HENT: Negative for rhinorrhea, sore throat and trouble swallowing  Eyes: Negative for pain  Respiratory: Negative for cough, shortness of breath, wheezing and stridor  Cardiovascular: Positive for chest pain  Negative for leg swelling  Gastrointestinal: Negative for abdominal pain, diarrhea and nausea  Endocrine: Negative for polyuria  Genitourinary: Negative for dysuria, flank pain and urgency  Musculoskeletal: Negative for joint swelling, myalgias and neck stiffness  Skin: Negative for rash  Allergic/Immunologic: Negative for immunocompromised state     Neurological: Negative for dizziness, syncope, weakness, numbness and headaches  Psychiatric/Behavioral: Negative for confusion and suicidal ideas  All other systems reviewed and are negative  Physical Exam  ED Triage Vitals [12/27/17 1915]   Temperature Pulse Respirations Blood Pressure SpO2   99 1 °F (37 3 °C) 84 18 152/94 100 %      Temp Source Heart Rate Source Patient Position - Orthostatic VS BP Location FiO2 (%)   Oral Monitor Sitting Left arm --      Pain Score       8           Orthostatic Vital Signs  Vitals:    12/27/17 1915 12/27/17 2156   BP: 152/94 164/72   Pulse: 84 70   Patient Position - Orthostatic VS: Sitting        Physical Exam   Constitutional: She is oriented to person, place, and time  She appears well-developed and well-nourished  HENT:   Head: Normocephalic and atraumatic  Eyes: EOM are normal  Pupils are equal, round, and reactive to light  Neck: Normal range of motion  Neck supple  Cardiovascular: Normal rate and regular rhythm  Exam reveals no friction rub  No murmur heard  Pulmonary/Chest: Breath sounds normal  No respiratory distress  She has no wheezes  She has no rales  She exhibits tenderness  Abdominal: Soft  Bowel sounds are normal  She exhibits no distension  There is no tenderness  Musculoskeletal: Normal range of motion  She exhibits no edema or tenderness  Neurological: She is alert and oriented to person, place, and time  Skin: Skin is warm  No rash noted  Psychiatric: She has a normal mood and affect  Nursing note and vitals reviewed        ED Medications  Medications   sodium chloride (PF) 0 9 % injection 3 mL (not administered)   lidocaine (LIDODERM) 5 % patch 1 patch (1 patch Transdermal Medication Applied 12/27/17 2152)   aspirin chewable tablet 324 mg (324 mg Oral Given 12/27/17 2013)   morphine (PF) 4 mg/mL injection 4 mg (4 mg Intravenous Given 12/27/17 2013)   iohexol (OMNIPAQUE) 350 MG/ML injection (MULTI-DOSE) 85 mL (85 mL Intravenous Given 12/27/17 2034) ketorolac (TORADOL) injection 30 mg (30 mg Intravenous Given 12/27/17 0809)       Diagnostic Studies  Results Reviewed     Procedure Component Value Units Date/Time    Troponin I [[id number]]  (Normal) Collected:  12/27/17 1940    Lab Status:  Final result Specimen:  Blood from Arm, Left Updated:  12/27/17 2008     Troponin I <0 02 ng/mL     Narrative:         [employer] Chemistry analyzer 99% cutoff is > 0 04 ng/mL in network labs    o cTnI 99% cutoff is useful only when applied to patients in the clinical setting of myocardial ischemia  o cTnI 99% cutoff should be interpreted in the context of clinical history, ECG findings and possibly cardiac imaging to establish correct diagnosis  o cTnI 99% cutoff may be suggestive but clearly not indicative of a coronary event without the clinical setting of myocardial ischemia  Basic metabolic panel [[id number]]  (Normal) Collected:  12/27/17 1940    Lab Status:  Final result Specimen:  Blood from Arm, Left Updated:  12/27/17 2005     Sodium 139 mmol/L      Potassium 4 9 mmol/L      Chloride 104 mmol/L      CO2 28 mmol/L      Anion Gap 7 mmol/L      BUN 12 mg/dL      Creatinine 0 84 mg/dL      Glucose 95 mg/dL      Calcium 8 7 mg/dL      eGFR 103 ml/min/1 73sq m     Narrative:         National Kidney Disease Education Program recommendations are as follows:  GFR calculation is accurate only with a steady state creatinine  Chronic Kidney disease less than 60 ml/min/1 73 sq  meters  Kidney failure less than 15 ml/min/1 73 sq  meters      Lipase [[id number]]  (Normal) Collected:  12/27/17 1940    Lab Status:  Final result Specimen:  Blood from Arm, Left Updated:  12/27/17 2004     Lipase 162 u/L     CBC and differential [[id number]]  (Abnormal) Collected:  12/27/17 1940    Lab Status:  Final result Specimen:  Blood from Arm, Left Updated:  12/27/17 1955     WBC 6 30 Thousand/uL      RBC 4 27 Million/uL      Hemoglobin 11 7 (L) g/dL      Hematocrit 35 6 (L) %      MCV 83 fL      MCH 27 3 pg      MCHC 32 8 g/dL      RDW 15 7 (H) %      MPV 8 3 (L) fL      Platelets 047 (H) Thousands/uL      Neutrophils Relative 61 %      Lymphocytes Relative 31 %      Monocytes Relative 5 %      Eosinophils Relative 2 %      Basophils Relative 1 %      Neutrophils Absolute 3 80 Thousands/µL      Lymphocytes Absolute 2 00 Thousands/µL      Monocytes Absolute 0 30 Thousand/µL      Eosinophils Absolute 0 10 Thousand/µL      Basophils Absolute 0 10 Thousands/µL                  CTA ED chest PE study   Final Result by Monique Parikh MD (12/27 2053)      No acute findings within the chest; specifically, no pulmonary arterial embolism or thoracic aortic aneurysm dissection in this patient with chest/back pain                             Workstation performed: [id number]         X-ray chest 2 views    (Results Pending)              Procedures  ECG 12 Lead Documentation  Date/Time: 12/27/2017 7:15 AM  Performed by: Stephy Pedraza  Authorized by: Stephy Pedraza     ECG reviewed by me, the ED Provider: yes    Patient location:  ED  Previous ECG:     Previous ECG:  Compared to current    Similarity:  No change  Interpretation:     Interpretation: normal    Rate:     ECG rate assessment: normal    Rhythm:     Rhythm: sinus rhythm    Ectopy:     Ectopy: none    QRS:     QRS axis:  Normal    QRS intervals:  Normal  Conduction:     Conduction: normal    ST segments:     ST segments:  Normal  T waves:     T waves: normal             Phone Contacts  ED Phone Contact    ED Course  ED Course          HEART Risk Score    Flowsheet Row Most Recent Value   History  0 Filed at: 12/27/2017 2212   ECG  0 Filed at: 12/27/2017 2212   Age  0 Filed at: 12/27/2017 2212   Risk Factors  0 Filed at: 12/27/2017 2212   Troponin  0 Filed at: 12/27/2017 2212   Heart Score Risk Calculator   History  0 Filed at: 12/27/2017 2212   ECG  0 Filed at: 12/27/2017 2212   Age  0 Filed at: 12/27/2017 2212   Risk Factors  0 Filed at: 12/27/2017 2212 Troponin  0 Filed at: 12/27/2017 2212   HEART Score  0 Filed at: 12/27/2017 2212   HEART Score  0 Filed at: 12/27/2017 2212                            MDM  Number of Diagnoses or Management Options  Chest discomfort: new and requires workup  Diagnosis management comments: 60-year-old with chest discomfort history of PE CT scan performed the department unremarkable for PE  Suspect likely costochondritis, muscle strain, muscle sprain, as the cause of the patient's discomfort and pain  Pain improved with dose of Toradol here given in the emergency department plan outpatient management with morphine as well lidocaine patch  Given strict instructions when to return back to the emergency department  Pt re-examined and evaluated after testing and treatment  Spoke with the patient and feeling improved and sxs have resolved  Will discharge home with close f/u with pcp and instructed to return to the ED if sxs worsen or continue  Pt agrees with the plan for discharge and feels comfortable to go home with proper f/u  Advised to return for worsening or additional problems  Diagnostic tests were reviewed and questions answered  Diagnosis, care plan and treatment options were discussed  The patient understand instructions and will follow up as directed  Amount and/or Complexity of Data Reviewed  Clinical lab tests: reviewed and ordered  Tests in the radiology section of CPT®: ordered and reviewed  Review and summarize past medical records: yes      CritCare Time    Disposition  Final diagnoses:   Chest discomfort     Time reflects when diagnosis was documented in both MDM as applicable and the Disposition within this note     Time User Action Codes Description Comment    12/27/2017  9:15 PM Krissy Cheek Add [R07 89] Chest discomfort       ED Disposition     ED Disposition Condition Comment    Discharge  Max Liliana discharge to home/self care      Condition at discharge: Stable        Follow-up Information     Follow up With Specialties Details Why Contact Info    Mynor Omer MD Family Medicine Call in 2 days If symptoms worsen [address] [facility]  [phone]          Discharge Medication List as of 12/27/2017  9:32 PM      START taking these medications    Details   lidocaine (LIDODERM) 5 % Place 1 patch on the skin daily Remove & Discard patch within 12 hours or as directed by MD, Starting Thu 12/28/2017, Print         CONTINUE these medications which have NOT CHANGED    Details   amLODIPine (NORVASC) 5 mg tablet Take 10 mg by mouth daily  , Historical Med      apixaban (ELIQUIS) 5 mg Take 5 mg by mouth 2 (two) times a day, Historical Med      butalbital-acetaminophen-caffeine (FIORICET,ESGIC) -40 mg per tablet Take 1 tablet by mouth as needed for headaches  , Historical Med      dicyclomine (BENTYL) 20 mg tablet Take 20 mg by mouth as needed  , Historical Med           No discharge procedures on file      ED Provider  Electronically Signed by           Lidia Baldwin DO  12/27/17 1861

## 2017-12-30 ENCOUNTER — APPOINTMENT (EMERGENCY)
Dept: RADIOLOGY | Facility: HOSPITAL | Age: 37
End: 2017-12-30
Payer: COMMERCIAL

## 2017-12-30 ENCOUNTER — HOSPITAL ENCOUNTER (EMERGENCY)
Facility: HOSPITAL | Age: 37
Discharge: HOME | End: 2017-12-31
Attending: EMERGENCY MEDICINE | Admitting: EMERGENCY MEDICINE
Payer: COMMERCIAL

## 2017-12-30 VITALS
HEART RATE: 84 BPM | BODY MASS INDEX: 38.74 KG/M2 | SYSTOLIC BLOOD PRESSURE: 140 MMHG | OXYGEN SATURATION: 98 % | WEIGHT: 270 LBS | DIASTOLIC BLOOD PRESSURE: 81 MMHG | RESPIRATION RATE: 18 BRPM | TEMPERATURE: 99 F

## 2017-12-30 DIAGNOSIS — R07.9 CHEST PAIN: Primary | ICD-10-CM

## 2017-12-30 DIAGNOSIS — K21.9 GERD (GASTROESOPHAGEAL REFLUX DISEASE): ICD-10-CM

## 2017-12-30 DIAGNOSIS — R10.13 EPIGASTRIC PAIN: ICD-10-CM

## 2017-12-30 LAB
ALBUMIN SERPL BCP-MCNC: 3 G/DL (ref 3.5–5)
ALP SERPL-CCNC: 72 U/L (ref 46–116)
ALT SERPL W P-5'-P-CCNC: 25 U/L (ref 12–78)
ANION GAP SERPL CALCULATED.3IONS-SCNC: 6 MMOL/L (ref 4–13)
AST SERPL W P-5'-P-CCNC: 17 U/L (ref 5–45)
BASOPHILS # BLD AUTO: 0.1 THOUSANDS/ΜL (ref 0–0.1)
BASOPHILS NFR BLD AUTO: 1 % (ref 0–1)
BILIRUB SERPL-MCNC: 0.1 MG/DL (ref 0.2–1)
BUN SERPL-MCNC: 13 MG/DL (ref 5–25)
CALCIUM SERPL-MCNC: 8.7 MG/DL (ref 8.3–10.1)
CHLORIDE SERPL-SCNC: 105 MMOL/L (ref 100–108)
CO2 SERPL-SCNC: 26 MMOL/L (ref 21–32)
CREAT SERPL-MCNC: 0.82 MG/DL (ref 0.6–1.3)
DEPRECATED D DIMER PPP: 280 NG/ML (FEU) (ref 190–520)
EOSINOPHIL # BLD AUTO: 0.1 THOUSAND/ΜL (ref 0–0.61)
EOSINOPHIL NFR BLD AUTO: 3 % (ref 0–6)
ERYTHROCYTE [DISTWIDTH] IN BLOOD BY AUTOMATED COUNT: 15.6 % (ref 11.6–15.1)
GFR SERPL CREATININE-BSD FRML MDRD: 106 ML/MIN/1.73SQ M
GLUCOSE SERPL-MCNC: 94 MG/DL (ref 65–140)
HCT VFR BLD AUTO: 34.4 % (ref 37–47)
HGB BLD-MCNC: 11 G/DL (ref 12–16)
LYMPHOCYTES # BLD AUTO: 2.3 THOUSANDS/ΜL (ref 0.6–4.47)
LYMPHOCYTES NFR BLD AUTO: 41 % (ref 14–44)
MCH RBC QN AUTO: 26.5 PG (ref 27–31)
MCHC RBC AUTO-ENTMCNC: 32 G/DL (ref 31.4–37.4)
MCV RBC AUTO: 83 FL (ref 82–98)
MONOCYTES # BLD AUTO: 0.5 THOUSAND/ΜL (ref 0.17–1.22)
MONOCYTES NFR BLD AUTO: 8 % (ref 4–12)
NEUTROPHILS # BLD AUTO: 2.6 THOUSANDS/ΜL (ref 1.85–7.62)
NEUTS SEG NFR BLD AUTO: 47 % (ref 43–75)
PLATELET # BLD AUTO: 433 THOUSANDS/UL (ref 130–400)
PMV BLD AUTO: 8.1 FL (ref 8.9–12.7)
POTASSIUM SERPL-SCNC: 4 MMOL/L (ref 3.5–5.3)
PROT SERPL-MCNC: 7.2 G/DL (ref 6.4–8.2)
RBC # BLD AUTO: 4.16 MILLION/UL (ref 4.2–5.4)
SODIUM SERPL-SCNC: 137 MMOL/L (ref 136–145)
TROPONIN I SERPL-MCNC: <0.02 NG/ML
WBC # BLD AUTO: 5.6 THOUSAND/UL (ref 4.8–10.8)

## 2017-12-30 PROCEDURE — 85025 COMPLETE CBC W/AUTO DIFF WBC: CPT | Performed by: EMERGENCY MEDICINE

## 2017-12-30 PROCEDURE — 80053 COMPREHEN METABOLIC PANEL: CPT | Performed by: EMERGENCY MEDICINE

## 2017-12-30 PROCEDURE — 36415 COLL VENOUS BLD VENIPUNCTURE: CPT | Performed by: EMERGENCY MEDICINE

## 2017-12-30 PROCEDURE — 74177 CT ABD & PELVIS W/CONTRAST: CPT

## 2017-12-30 PROCEDURE — 71010 HB  X-RAY EXAM CHEST 1 VIEW (PORTABLE): CPT

## 2017-12-30 PROCEDURE — 96374 THER/PROPH/DIAG INJ IV PUSH: CPT

## 2017-12-30 PROCEDURE — 84484 ASSAY OF TROPONIN QUANT: CPT | Performed by: EMERGENCY MEDICINE

## 2017-12-30 PROCEDURE — 93005 ELECTROCARDIOGRAM TRACING: CPT | Performed by: EMERGENCY MEDICINE

## 2017-12-30 PROCEDURE — 93005 ELECTROCARDIOGRAM TRACING: CPT

## 2017-12-30 PROCEDURE — 85379 FIBRIN DEGRADATION QUANT: CPT | Performed by: EMERGENCY MEDICINE

## 2017-12-30 RX ADMIN — HYDROMORPHONE HYDROCHLORIDE 1 MG: 1 INJECTION, SOLUTION INTRAMUSCULAR; INTRAVENOUS; SUBCUTANEOUS at 23:27

## 2017-12-31 LAB
ATRIAL RATE: 89 BPM
P AXIS: 54 DEGREES
PR INTERVAL: 140 MS
QRS AXIS: 8 DEGREES
QRSD INTERVAL: 80 MS
QT INTERVAL: 364 MS
QTC INTERVAL: 442 MS
T WAVE AXIS: 36 DEGREES
VENTRICULAR RATE: 89 BPM

## 2017-12-31 PROCEDURE — 99285 EMERGENCY DEPT VISIT HI MDM: CPT

## 2017-12-31 RX ORDER — SUCRALFATE ORAL 1 G/10ML
1 SUSPENSION ORAL 4 TIMES DAILY
Qty: 420 ML | Refills: 0 | Status: SHIPPED | OUTPATIENT
Start: 2017-12-31

## 2017-12-31 RX ORDER — PANTOPRAZOLE SODIUM 20 MG/1
20 TABLET, DELAYED RELEASE ORAL DAILY
Qty: 20 TABLET | Refills: 0 | Status: SHIPPED | OUTPATIENT
Start: 2017-12-31

## 2017-12-31 RX ADMIN — IOHEXOL 100 ML: 350 INJECTION, SOLUTION INTRAVENOUS at 00:04

## 2017-12-31 NOTE — DISCHARGE INSTRUCTIONS
Abdominal Pain   WHAT YOU NEED TO KNOW:   Abdominal pain can be dull, achy, or sharp  You may have pain in one area of your abdomen, or in your entire abdomen  Your pain may be caused by a condition such as constipation, food sensitivity or poisoning, infection, or a blockage  Abdominal pain can also be from a hernia, appendicitis, or an ulcer  Liver, gallbladder, or kidney conditions can also cause abdominal pain  The cause of your abdominal pain may be unknown  DISCHARGE INSTRUCTIONS:   Return to the emergency department if:   · You have new chest pain or shortness of breath  · You have pulsing pain in your upper abdomen or lower back that suddenly becomes constant  · Your pain is in the right lower abdominal area and worsens with movement  · You have a fever over 100 4°F (38°C) or shaking chills  · You are vomiting and cannot keep food or liquids down  · Your pain does not improve or gets worse over the next 8 to 12 hours  · You see blood in your vomit or bowel movements, or they look black and tarry  · Your skin or the whites of your eyes turn yellow  · You are a woman and have a large amount of vaginal bleeding that is not your monthly period  Contact your healthcare provider if:   · You have pain in your lower back  · You are a man and have pain in your testicles  · You have pain when you urinate  · You have questions or concerns about your condition or care  Follow up with your healthcare provider within 24 hours or as directed:  Write down your questions so you remember to ask them during your visits  Medicines:   · Medicines  may be given to calm your stomach and prevent vomiting or to decrease pain  Ask how to take pain medicine safely  · Take your medicine as directed  Contact your healthcare provider if you think your medicine is not helping or if you have side effects  Tell him of her if you are allergic to any medicine   Keep a list of the medicines, vitamins, and herbs you take  Include the amounts, and when and why you take them  Bring the list or the pill bottles to follow-up visits  Carry your medicine list with you in case of an emergency  © 2017 [employer]  Information is for End User's use only and may not be sold, redistributed or otherwise used for commercial purposes  All illustrations and images included in CareNotes® are the copyrighted property of [employer] M , [employer]  or [employer]  The above information is an  only  It is not intended as medical advice for individual conditions or treatments  Talk to your doctor, nurse or pharmacist before following any medical regimen to see if it is safe and effective for you  Gastroesophageal Reflux Disease   WHAT YOU NEED TO KNOW:   Gastroesophageal reflux occurs when acid and food in the stomach back up into the esophagus  Gastroesophageal reflux disease (GERD) is reflux that occurs more than twice a week for a few weeks  It usually causes heartburn and other symptoms  GERD can cause other health problems over time if it is not treated  DISCHARGE INSTRUCTIONS:   Return to the emergency department if:   · You feel full and cannot burp or vomit  · You have severe chest pain and sudden trouble breathing  · Your bowel movements are black, bloody, or tarry-looking  · Your vomit looks like coffee grounds or has blood in it  Contact your healthcare provider if:   · You vomit large amounts, or you vomit often  · You have trouble breathing after you vomit  · You have trouble swallowing, or pain with swallowing  · You are losing weight without trying  · Your symptoms get worse or do not improve with treatment  · You have questions or concerns about your condition or care  Medicines:   · Medicines  are used to decrease stomach acid  Medicine may also be used to help your lower esophageal sphincter and stomach contract (tighten) more      · Take your medicine as directed  Contact your healthcare provider if you think your medicine is not helping or if you have side effects  Tell him of her if you are allergic to any medicine  Keep a list of the medicines, vitamins, and herbs you take  Include the amounts, and when and why you take them  Bring the list or the pill bottles to follow-up visits  Carry your medicine list with you in case of an emergency  Manage GERD:   · Do not have foods or drinks that may increase heartburn  These include chocolate, peppermint, fried or fatty foods, drinks that contain caffeine, or carbonated drinks (soda)  Other foods include spicy foods, onions, tomatoes, and tomato-based foods  Do not have foods or drinks that can irritate your esophagus, such as citrus fruits, juices, and alcohol  · Do not eat large meals  When you eat a lot of food at one time, your stomach needs more acid to digest it  Eat 6 small meals each day instead of 3 large ones, and eat slowly  Do not eat meals 2 to 3 hours before bedtime  · Elevate the head of your bed  Place 6-inch blocks under the head of your bed frame  You may also use more than one pillow under your head and shoulders while you sleep  · Maintain a healthy weight  If you are overweight, weight loss may help relieve symptoms of GERD  · Do not smoke  Smoking weakens the lower esophageal sphincter and increases the risk of GERD  Ask your healthcare provider for information if you currently smoke and need help to quit  E-cigarettes or smokeless tobacco still contain nicotine  Talk to your healthcare provider before you use these products  · Do not wear clothing that is tight around your waist   Tight clothing can put pressure on your stomach and cause or worsen GERD symptoms  Follow up with your healthcare provider as directed:  Write down your questions so you remember to ask them during your visits    © 2017 [employer] Information is for End User's use only and may not be sold, redistributed or otherwise used for commercial purposes  All illustrations and images included in CareNotes® are the copyrighted property of A [employer] M , Inc  or [employer]  The above information is an  only  It is not intended as medical advice for individual conditions or treatments  Talk to your doctor, nurse or pharmacist before following any medical regimen to see if it is safe and effective for you

## 2017-12-31 NOTE — ED PROVIDER NOTES
History  Chief Complaint   Patient presents with    Chest Pain     pt recently here for same, states that it never went away  States it is under her right breast and describes it as sharp     Patient returns complaining of pain in the center of the chest and including the upper abdomen that she has been having persistently daily for several days  She was seen here few days ago and was evaluated for cardiac disease and pulmonary embolism underwent CT scan of the chest   Patient was treated and released  She returns with the same pain stating is not any better  She states sometimes is worse when she lays down flat at night  She does not notice any difference with food ingestion  No belching no fever  Patient does admit that she has a history of Crohn's disease of she recently stopped taking her medications because she was tired of taking them  She still has occasional blood in the stool            Prior to Admission Medications   Prescriptions Last Dose Informant Patient Reported? Taking?    amLODIPine (NORVASC) 5 mg tablet   Yes No   Sig: Take 10 mg by mouth daily     apixaban (ELIQUIS) 5 mg   Yes No   Sig: Take 5 mg by mouth 2 (two) times a day   butalbital-acetaminophen-caffeine (FIORICET,ESGIC) -40 mg per tablet   Yes No   Sig: Take 1 tablet by mouth as needed for headaches     dicyclomine (BENTYL) 20 mg tablet   Yes No   Sig: Take 20 mg by mouth as needed     lidocaine (LIDODERM) 5 %   No No   Sig: Place 1 patch on the skin daily Remove & Discard patch within 12 hours or as directed by MD   morphine (MSIR) 15 mg tablet   No No   Sig: Take 1 tablet by mouth every 4 (four) hours as needed for severe pain for up to 10 doses Max Daily Amount: 90 mg      Facility-Administered Medications: None       Past Medical History:   Diagnosis Date    Bowel obstruction     Cancer (HCC)     ovarian    Chronic narcotic dependence ([facility] [address] [facility]     Chronic pain     abdominal    Chronic ulcerative colitis ([facility] [address] [facility]     Colitis     Fibroids     H/O blood clots     Hypertension     IBS (irritable bowel syndrome)     Obesity (BMI 30 0-34  9)     Recurrent Pelvic fluid collection     Vaginal fistula        Past Surgical History:   Procedure Laterality Date    ABDOMINAL SURGERY      per pt "about 21"    APPENDECTOMY      BOWEL RESECTION      BOWEL RESECTION      "removed entire large bowel"     SECTION      CHOLECYSTECTOMY      COLON SURGERY      EVACUATION OF HEMATOMA      EXAMINATION UNDER ANESTHESIA N/A 2016    Procedure: EXAM UNDER ANESTHESIA (EUA)  pouch-oscopy;  Surgeon: Jesusita Madsen MD;  [facility];  Service:     HERNIA REPAIR      ILEOSTOMY      PARACENTESIS      PARTIAL HYSTERECTOMY      [place] COLONOSCOPY FLX DX W/COLLJ [facility] WHEN PFRMD N/A 2017    Procedure: COLONOSCOPY;  Surgeon: Kelly Parekh MD;  [facility]: [facility]; Service: Colorectal    [place] SIGMOIDOSCOPY,DIAGNOSTIC N/A 10/30/2017    Procedure: Rafiq Liu;  Surgeon: Justin Thorpe MD;  Location: [place] [address] GI LAB; Service: Gastroenterology    TUBAL LIGATION         Family History   Problem Relation Age of Onset    Ulcerative colitis Mother     Cancer Paternal Grandfather      I have reviewed and agree with the history as documented  Social History   Substance Use Topics    Smoking status: Never Smoker    Smokeless tobacco: Never Used    Alcohol use Yes      Comment: occasional        Review of Systems   Constitutional: Negative for fever  HENT: Positive for sore throat  Respiratory: Negative for cough and shortness of breath  Cardiovascular: Positive for chest pain  Gastrointestinal: Positive for abdominal pain, blood in stool and diarrhea  Negative for vomiting  Genitourinary: Negative for hematuria  Neurological: Positive for weakness  All other systems reviewed and are negative        Physical Exam  ED Triage Vitals   Temperature Pulse Respirations Blood Pressure SpO2   17 1957 12/30/17 1955 12/30/17 1955 12/30/17 1955 12/30/17 1955   99 °F (37 2 °C) 90 18 143/77 96 %      Temp Source Heart Rate Source Patient Position - Orthostatic VS BP Location FiO2 (%)   12/30/17 1957 12/30/17 1955 12/30/17 1955 12/30/17 1955 --   Oral Monitor Lying Right arm       Pain Score       12/30/17 1955       8           Orthostatic Vital Signs  Vitals:    12/30/17 1955 12/30/17 2251   BP: 143/77 140/81   Pulse: 90 84   Patient Position - Orthostatic VS: Lying Lying       Physical Exam   Constitutional: She is oriented to person, place, and time  Obese female not acutely distressed   HENT:   Head: Normocephalic and atraumatic  Mouth/Throat: Oropharynx is clear and moist    Eyes: Conjunctivae are normal    Neck: Normal range of motion  Neck supple  Cardiovascular: Normal rate, regular rhythm and normal heart sounds  Pulmonary/Chest: Effort normal and breath sounds normal    Abdominal: Soft  Bowel sounds are normal  There is tenderness  Musculoskeletal: Normal range of motion  Neurological: She is alert and oriented to person, place, and time  Skin: Skin is warm and dry  No rash noted  Psychiatric: She has a normal mood and affect  Her behavior is normal    Nursing note and vitals reviewed        ED Medications  Medications   HYDROmorphone (DILAUDID) 1 mg/mL injection 1 mg (1 mg Intravenous Given 12/30/17 2327)   iohexol (OMNIPAQUE) 350 MG/ML injection (MULTI-DOSE) 100 mL (100 mL Intravenous Given 12/31/17 0004)       Diagnostic Studies  Results Reviewed     Procedure Component Value Units Date/Time    Comprehensive metabolic panel [[id number]]  (Abnormal) Collected:  12/30/17 2221    Lab Status:  Final result Specimen:  Blood from Arm, Left Updated:  12/30/17 2309     Sodium 137 mmol/L      Potassium 4 0 mmol/L      Chloride 105 mmol/L      CO2 26 mmol/L      Anion Gap 6 mmol/L      BUN 13 mg/dL      Creatinine 0 82 mg/dL      Glucose 94 mg/dL      Calcium 8 7 mg/dL      AST 17 U/L      ALT 25 U/L      Alkaline Phosphatase 72 U/L      Total Protein 7 2 g/dL      Albumin 3 0 (L) g/dL      Total Bilirubin 0 10 (L) mg/dL      eGFR 106 ml/min/1 73sq m     Narrative:         National Kidney Disease Education Program recommendations are as follows:  GFR calculation is accurate only with a steady state creatinine  Chronic Kidney disease less than 60 ml/min/1 73 sq  meters  Kidney failure less than 15 ml/min/1 73 sq  meters  D-dimer, quantitative [[id number]]  (Normal) Collected:  12/30/17 2221    Lab Status:  Final result Specimen:  Blood from Arm, Left Updated:  12/30/17 2255     D-Dimer, Quant 280 ng/ml (FEU)     Troponin I [[id number]]  (Normal) Collected:  12/30/17 2221    Lab Status:  Final result Specimen:  Blood from Arm, Left Updated:  12/30/17 2248     Troponin I <0 02 ng/mL     Narrative:         [employer] Chemistry analyzer 99% cutoff is > 0 04 ng/mL in network labs    o cTnI 99% cutoff is useful only when applied to patients in the clinical setting of myocardial ischemia  o cTnI 99% cutoff should be interpreted in the context of clinical history, ECG findings and possibly cardiac imaging to establish correct diagnosis  o cTnI 99% cutoff may be suggestive but clearly not indicative of a coronary event without the clinical setting of myocardial ischemia      CBC and differential [[id number]]  (Abnormal) Collected:  12/30/17 2221    Lab Status:  Final result Specimen:  Blood from Arm, Left Updated:  12/30/17 2241     WBC 5 60 Thousand/uL      RBC 4 16 (L) Million/uL      Hemoglobin 11 0 (L) g/dL      Hematocrit 34 4 (L) %      MCV 83 fL      MCH 26 5 (L) pg      MCHC 32 0 g/dL      RDW 15 6 (H) %      MPV 8 1 (L) fL      Platelets 496 (H) Thousands/uL      Neutrophils Relative 47 %      Lymphocytes Relative 41 %      Monocytes Relative 8 %      Eosinophils Relative 3 %      Basophils Relative 1 %      Neutrophils Absolute 2 60 Thousands/µL      Lymphocytes Absolute 2 30 Thousands/µL      Monocytes Absolute 0 50 Thousand/µL      Eosinophils Absolute 0 10 Thousand/µL      Basophils Absolute 0 10 Thousands/µL                  XR chest 1 view portable    (Results Pending)   CT abdomen pelvis with contrast    (Results Pending)              Procedures  Procedures       Phone Contacts  ED Phone Contact    ED Course  ED Course                                MDM  Number of Diagnoses or Management Options  Diagnosis management comments: After a negative cardiac evaluation including evaluation for PE, which suggest Crohn's disease may be the next most  likely etiology  I will check CT scan but I suggest the patient needs an endoscopy    CritCare Time    Disposition  Final diagnoses:   Chest pain   Epigastric pain   GERD (gastroesophageal reflux disease)     Time reflects when diagnosis was documented in both MDM as applicable and the Disposition within this note     Time User Action Codes Description Comment    12/31/2017  1:20 AM Dorlene Ramirez A Add [R07 9] Chest pain     12/31/2017  1:20 AM Dorlene Ramirez A Add [R10 13] Epigastric pain     12/31/2017  1:21 AM Dorlene Ramirez A Add [K21 9] GERD (gastroesophageal reflux disease)       ED Disposition     ED Disposition Condition Comment    Discharge  Max Liliana discharge to home/self care      Condition at discharge: Stable        Follow-up Information     Follow up With Specialties Details Why Contact Info    Carla Pemberton MD Family Medicine Schedule an appointment as soon as possible for a visit in 1 day  [address] [place] [address]  [phone]      GI specialist  Schedule an appointment as soon as possible for a visit in 2 days          Patient's Medications   Discharge Prescriptions    PANTOPRAZOLE (PROTONIX) 20 MG TABLET    Take 1 tablet by mouth daily       Start Date: 12/31/2017End Date: --       Order Dose: 20 mg       Quantity: 20 tablet    Refills: 0    SUCRALFATE (CARAFATE) 1 G/10 ML SUSPENSION    Take 10 mL by mouth 4 (four) times a day Start Date: 12/31/2017End Date: --       Order Dose: 1 g       Quantity: 420 mL    Refills: 0     No discharge procedures on file      ED Provider  Electronically Signed by           Quintin Nguyen MD  12/31/17 1686

## 2017-12-31 NOTE — ED NOTES
Dr Maribel Smith in to see pt as requested by pt  Pt reports pain to right chest continues         Yimi Guillen RN  12/30/17 3096

## 2018-01-16 NOTE — PROCEDURES
Procedures by Kali Wilder RN at 8/11/2016  8:14 AM      Author:  Kali Wilder RN Service:  Interventional Radiology  Author Type:  Registered Nurse     Filed:  8/11/2016  8:28 AM Date of Service:  8/11/2016  8:14 AM Status:  Signed     :  Kali Wilder RN (Registered Nurse)         Procedure Orders:       1  Insert PICC line [[id number]] ordered by Kali Wilder RN at 08/11/16 0124                    PICC Line Insertion  Date/Time: 8/11/2016 8:14 AM  Performed by: Steve Soni  Authorized by: Donna Kuhn     Patient location:  Other (comment) (IR)  Consent:     Consent obtained:  Written    Consent given by:  Patient  Universal protocol:     Procedure explained and questions answered to patient or proxy's satisfaction: yes      Relevant documents present and verified: yes      Imaging studies available: yes      Required blood products,  implants, devices, and special equipment available: yes      Site/side marked: yes      Immediately prior to procedure, a time out was called: yes      Patient identity confirmed:  Verbally with patient and arm band  Pre-procedure details:     Hand hygiene: Hand hygiene performed prior to insertion      Sterile barrier technique: All elements of maximal sterile technique followed      Skin preparation:  2% chlorhexidine    Skin preparation agent: Skin preparation agent completely dried prior to procedure    Indications:     PICC line indications: vascular access    Anesthesia (see MAR for exact dosages):      Anesthesia method:  None  Procedure details:     Vessel type: vein      Laterality:  Right    Approach: percutaneous technique used      Patient position:  Flat    Procedural supplies:  Double lumen    Catheter size:  5 5 Fr    Ultrasound guidance: yes      Sterile ultrasound techniques: Sterile gel and sterile probe covers were used      Number of attempts:  1    Successful placement: yes      Vessel of catheter tip end:  CAJ    Total catheter length (cm):  43    Catheter out on skin (cm):  2 cm    Max flow rate:  999    Arm circumference:  36 cm  Post-procedure details:     Post-procedure:  Dressing applied    Assessment:  Blood return through all ports    Post-procedure complications: none      Patient tolerance of procedure:   Tolerated well, no immediate complications                     Received for:Provider  EPIC   Aug 11 2016  8:26AM [facility] Standard Time

## 2020-01-06 NOTE — SOCIAL WORK
DASH discussion completed  Discussed goals of making sure pt's needs are met upon discharge, pt's preferences are taken into account, pt understands her health condition, medications and symptoms to watch for after returning home and pt is aware of any follow up appointments recommended by hospital physician  PT INDEPENDENT WITH HER OWN CARE  NO DCP NEEDS NOTED  Left foot

## 2022-09-02 NOTE — ED PROVIDER NOTES
History  Chief Complaint   Patient presents with    Abdominal Pain     Pt c/o abd pain rt lower quadrant since last night  Associated with NVD  Called PMD at 0200 and told to come to the ED for eval      Pt c/o abd pain rt lower quadrant since last night  Associated with NVD  Called PMD at 0200 and told to come to the ED for eval   No fever, no chills  No pain with urination  NO vaginal bleeding, no vaginal discharge  History provided by:  Patient   used: No    Abdominal Pain   Pain location:  RLQ  Pain quality: sharp    Pain radiates to:  Does not radiate  Pain severity:  Moderate  Onset quality:  Gradual  Duration:  2 days  Timing:  Constant  Progression:  Unchanged  Chronicity:  Recurrent  Relieved by:  Nothing  Worsened by:  Nothing  Ineffective treatments:  OTC medications  Associated symptoms: diarrhea, nausea and vomiting        Prior to Admission Medications   Prescriptions Last Dose Informant Patient Reported? Taking?    QUEtiapine (SEROquel) 100 mg tablet 10/12/2017 at Unknown time  Yes Yes   Sig: Take 100 mg by mouth daily at bedtime   amLODIPine (NORVASC) 5 mg tablet 10/12/2017 at Unknown time  Yes Yes   Sig: Take 10 mg by mouth daily     apixaban (ELIQUIS) 5 mg   Yes Yes   Sig: Take 5 mg by mouth 2 (two) times a day   butalbital-acetaminophen-caffeine (FIORICET,ESGIC) -40 mg per tablet   Yes No   Sig: Take 1 tablet by mouth every 4 (four) hours as needed for headaches   dicyclomine (BENTYL) 20 mg tablet 10/12/2017 at Unknown time  Yes Yes   Sig: Take 20 mg by mouth every 6 (six) hours   ondansetron (ZOFRAN-ODT) 4 mg disintegrating tablet   No No   Sig: Take 1 tablet by mouth every 6 (six) hours as needed for nausea or vomiting   prazosin (MINIPRESS) 2 mg capsule 10/12/2017 at Unknown time  Yes Yes   Sig: Take 2 mg by mouth daily at bedtime   traZODone (DESYREL) 150 mg tablet  Self Yes No   Sig: Take 150 mg by mouth daily at bedtime      Facility-Administered Medications: None       Past Medical History:   Diagnosis Date    Bowel obstruction     Cancer ([facility] [address] )     ovarian    Chronic narcotic dependence (HCC)     Chronic pain     Chronic ulcerative colitis ([facility] [address] )     Colitis     Fibroids     H/O blood clots     Hypertension     IBS (irritable bowel syndrome)     Obesity (BMI 30 0-34  9)     Recurrent Pelvic fluid collection     Vaginal fistula        Past Surgical History:   Procedure Laterality Date    ABDOMINAL SURGERY      per pt "about 21"    APPENDECTOMY      BOWEL RESECTION      BOWEL RESECTION      "removed entire large bowel"     SECTION      CHOLECYSTECTOMY      COLON SURGERY      EVACUATION OF HEMATOMA      EXAMINATION UNDER ANESTHESIA N/A 2016    Procedure: EXAM UNDER ANESTHESIA (EUA)  pouch-oscopy;  Surgeon: Bogdan Mercer MD;  [facility];  Service:     HERNIA REPAIR      ILEOSTOMY      PARACENTESIS      PARTIAL HYSTERECTOMY      TN COLONOSCOPY FLX DX W/COLLJ [facility] WHEN PFRMD N/A 2017    Procedure: COLONOSCOPY;  Surgeon: Susana June MD;  Location: [facility]; Service: Colorectal    TUBAL LIGATION         Family History   Problem Relation Age of Onset    Ulcerative colitis Mother     Cancer Paternal Grandfather      I have reviewed and agree with the history as documented  Social History   Substance Use Topics    Smoking status: Never Smoker    Smokeless tobacco: Never Used    Alcohol use Yes      Comment: occasional        Review of Systems   Gastrointestinal: Positive for abdominal pain, diarrhea, nausea and vomiting  All other systems reviewed and are negative        Physical Exam  ED Triage Vitals   Temperature Pulse Respirations Blood Pressure SpO2   10/13/17 0746 10/13/17 0746 10/13/17 0746 10/13/17 0746 10/13/17 0746   97 8 °F (36 6 °C) 85 16 146/65 96 %      Temp src Heart Rate Source Patient Position - Orthostatic VS BP Location FiO2 (%)   -- 10/13/17 1020 10/13/17 1020 10/13/17 1020 --    Monitor Lying Right arm       Pain Score       10/13/17 0746       4           Physical Exam   Constitutional: She is oriented to person, place, and time  She appears well-developed and well-nourished  HENT:   Head: Normocephalic and atraumatic  Eyes: EOM are normal  Pupils are equal, round, and reactive to light  Neck: Normal range of motion  Neck supple  Cardiovascular: Normal rate and regular rhythm  Pulmonary/Chest: Effort normal and breath sounds normal    Abdominal: Soft  Bowel sounds are normal    (+) tenderness to rlq, no guarding, no rebound, no distention    Musculoskeletal: Normal range of motion  Neurological: She is alert and oriented to person, place, and time  Skin: Skin is warm  Capillary refill takes less than 2 seconds  Psychiatric: She has a normal mood and affect  Her behavior is normal    Nursing note and vitals reviewed        ED Medications  Medications   ondansetron (ZOFRAN) injection 4 mg (4 mg Intravenous Given 10/13/17 1012)   famotidine (PEPCID) injection 20 mg (20 mg Intravenous Given 10/13/17 1014)   morphine (PF) 4 mg/mL injection 4 mg (4 mg Intravenous Given 10/13/17 1016)   morphine (PF) 4 mg/mL injection 4 mg (4 mg Intravenous Given 10/13/17 1230)       Diagnostic Studies  Labs Reviewed   CBC AND DIFFERENTIAL - Abnormal        Result Value Ref Range Status    WBC 4 30 (*) 4 80 - 10 80 Thousand/uL Final    RBC 4 15 (*) 4 20 - 5 40 Million/uL Final    Hemoglobin 11 3 (*) 12 0 - 16 0 g/dL Final    Hematocrit 34 9 (*) 37 0 - 47 0 % Final    RDW 16 2 (*) 11 6 - 15 1 % Final    MPV 8 2 (*) 8 9 - 12 7 fL Final    Lymphocytes Relative 45 (*) 14 - 44 % Final    Basophils Relative 2 (*) 0 - 1 % Final    Neutrophils Absolute 1 80 (*) 1 85 - 7 62 Thousands/µL Final    MCV 84  82 - 98 fL Final    MCH 27 2  27 0 - 31 0 pg Final    MCHC 32 3  31 4 - 37 4 g/dL Final    Platelets 604  936 - 400 Thousands/uL Final    Neutrophils Relative 43  43 - 75 % Final    Monocytes Relative 8  4 - 12 % Final    Eosinophils Relative 2  0 - 6 % Final    Lymphocytes Absolute 2 00  0 60 - 4 47 Thousands/µL Final    Monocytes Absolute 0 30  0 17 - 1 22 Thousand/µL Final    Eosinophils Absolute 0 10  0 00 - 0 61 Thousand/µL Final    Basophils Absolute 0 10  0 00 - 0 10 Thousands/µL Final   COMPREHENSIVE METABOLIC PANEL - Abnormal     Albumin 3 3 (*) 3 5 - 5 0 g/dL Final    Total Bilirubin 0 10 (*) 0 20 - 1 00 mg/dL Final    Sodium 137  136 - 145 mmol/L Final    Potassium 4 0  3 5 - 5 3 mmol/L Final    Chloride 104  100 - 108 mmol/L Final    CO2 24  21 - 32 mmol/L Final    Anion Gap 9  4 - 13 mmol/L Final    BUN 15  5 - 25 mg/dL Final    Creatinine 0 78  0 60 - 1 30 mg/dL Final    Comment: Standardized to IDMS reference method    Glucose 95  65 - 140 mg/dL Final    Comment:   If the patient is fasting, the ADA then defines impaired fasting glucose as > 100 mg/dL and diabetes as > or equal to 123 mg/dL  Specimen collection should occur prior to Sulfasalazine administration due to the potential for falsely depressed results  Specimen collection should occur prior to Sulfapyridine administration due to the potential for falsely elevated results  Calcium 8 6  8 3 - 10 1 mg/dL Final    AST 15  5 - 45 U/L Final    Comment:   Specimen collection should occur prior to Sulfasalazine administration due to the potential for falsely depressed results  ALT 22  12 - 78 U/L Final    Comment:   Specimen collection should occur prior to Sulfasalazine administration due to the potential for falsely depressed results  Alkaline Phosphatase 64  46 - 116 U/L Final    Total Protein 7 6  6 4 - 8 2 g/dL Final    eGFR 113  ml/min/1 73sq m Final    Narrative:     National Kidney Disease Education Program recommendations are as follows:  GFR calculation is accurate only with a steady state creatinine  Chronic Kidney disease less than 60 ml/min/1 73 sq  meters  Kidney failure less than 15 ml/min/1 73 sq  meters  LIPASE - Normal    Lipase 92  73 - 393 u/L Final   URINALYSIS WITH REFLEX TO MICROSCOPIC - Normal    Color, UA Light Yellow   Final    Clarity, UA Clear   Final    Specific Gravity, UA 1 025  1 000 - 1 030 Final    pH, UA 5 5  5 0 - 9 0 Final    Leukocytes, UA Negative  Negative Final    Nitrite, UA Negative  Negative Final    Protein, UA Negative  Negative mg/dl Final    Glucose, UA Negative  Negative mg/dl Final    Ketones, UA Negative  Negative mg/dl Final    Urobilinogen, UA 0 2  0 2, 1 0 E U /dl E U /dl Final    Bilirubin, UA Negative  Negative Final    Blood, UA Negative  Negative Final   PROTIME-INR - Normal    Protime 9 8  9 4 - 11 7 seconds Final    INR 0 93  0 86 - 1 16 Final   APTT - Normal    PTT 25  24 - 33 seconds Final    Narrative: Therapeutic Heparin Range = 60-90 seconds   POCT PREGNANCY, URINE - Normal    EXT PREG TEST UR (Ref: Negative)     Final    Comment: negative       CT abdomen pelvis wo contrast   Final Result      No evidence of bowel obstruction or focal inflammatory changes  Status post colectomy  Mild enlargement of 6 7 cm left adnexal cystic structure  Nonemergent follow-up pelvic ultrasound recommended  LIMITED STUDY WITHOUT ORAL OR IV CONTRAST  ##[id number]         Workstation performed: [id number]             Procedures  Procedures      Phone Contacts  ED Phone Contact    ED Course  ED Course                                MDM  Number of Diagnoses or Management Options  Diagnosis management comments: Abdominal pain    Patient resting comfortably  States improvement in symptoms  Patient to be discharged home  Advised the patient of findings on CT scan which consists of an ovarian cyst   Advised patient to follow up with gyn in 2 days          Amount and/or Complexity of Data Reviewed  Clinical lab tests: ordered and reviewed  Tests in the radiology section of CPT®: ordered and reviewed  Tests in the medicine section of CPT®: ordered and reviewed  Discuss the patient with other providers: yes    Risk of Complications, Morbidity, and/or Mortality  Presenting problems: high  Diagnostic procedures: high  Management options: high    Patient Progress  Patient progress: stable    CritCare Time    Disposition  Final diagnoses:   Abdominal pain   Ovarian cyst     ED Disposition     ED Disposition Condition Comment    Discharge  Joni Sahin discharge to home/self care      Condition at discharge: Stable        Follow-up Information     Follow up With Specialties Details Why Contact Info    Joana Abdi MD Family Medicine  return If symptoms worsen, take medications as prescribed, call to make an appointment, follow up with your doctor in 2 days, [address] [facility]      Rosalba Hawkins MD Obstetrics and Gynecology  return If symptoms worsen, take medications as prescribed, call to make an appointment, follow up with your doctor in 2 days, [address]  [address]  [phone]          Discharge Medication List as of 10/13/2017 12:52 PM      CONTINUE these medications which have NOT CHANGED    Details   amLODIPine (NORVASC) 5 mg tablet Take 10 mg by mouth daily  , Historical Med      apixaban (ELIQUIS) 5 mg Take 5 mg by mouth 2 (two) times a day, Historical Med      dicyclomine (BENTYL) 20 mg tablet Take 20 mg by mouth every 6 (six) hours, Historical Med      prazosin (MINIPRESS) 2 mg capsule Take 2 mg by mouth daily at bedtime, Historical Med      QUEtiapine (SEROquel) 100 mg tablet Take 100 mg by mouth daily at bedtime, Historical Med      butalbital-acetaminophen-caffeine (FIORICET,ESGIC) -40 mg per tablet Take 1 tablet by mouth every 4 (four) hours as needed for headaches, Historical Med      ondansetron (ZOFRAN-ODT) 4 mg disintegrating tablet Take 1 tablet by mouth every 6 (six) hours as needed for nausea or vomiting, Starting Fri 7/28/2017, Print      traZODone (DESYREL) 150 mg tablet Take 150 mg by mouth daily at bedtime, Historical Med           No discharge procedures on file      ED Provider  Electronically Signed by       Samuel Hodge MD  10/13/17 0630 [No Acute Distress] : no acute distress [Normal Oropharynx] : normal oropharynx [II] : Mallampati Class: II [Normal Appearance] : normal appearance [Supple] : supple [No JVD] : no jvd [Normal Rate/Rhythm] : normal rate/rhythm [Normal S1, S2] : normal s1, s2 [No Murmurs] : no murmurs [No Resp Distress] : no resp distress [No Acc Muscle Use] : no acc muscle use [Normal Palpation] : normal palpation [Normal Rhythm and Effort] : normal rhythm and effort [Clear to Auscultation Bilaterally] : clear to auscultation bilaterally [Normal to Percussion] : normal to percussion [Benign] : benign [Not Tender] : not tender [Soft] : soft [No HSM] : no hsm [Normal Bowel Sounds] : normal bowel sounds [No Clubbing] : no clubbing [No Cyanosis] : no cyanosis [No Edema] : no edema [Normal Color/ Pigmentation] : normal color/ pigmentation [No Focal Deficits] : no focal deficits [Oriented x3] : oriented x3 [Normal Affect] : normal affect [TextBox_2] : Overweight [TextBox_99] : Ambulating with walker in a boot

## 2024-10-10 ENCOUNTER — OFFICE VISIT (OUTPATIENT)
Dept: URBAN - METROPOLITAN AREA CLINIC 68 | Facility: CLINIC | Age: 44
End: 2024-10-10

## 2024-10-31 ENCOUNTER — OFFICE VISIT (OUTPATIENT)
Dept: URBAN - METROPOLITAN AREA CLINIC 66 | Facility: CLINIC | Age: 44
End: 2024-10-31

## 2024-11-13 ENCOUNTER — DASHBOARD ENCOUNTERS (OUTPATIENT)
Age: 44
End: 2024-11-13

## 2024-11-14 ENCOUNTER — OFFICE VISIT (OUTPATIENT)
Dept: URBAN - METROPOLITAN AREA CLINIC 66 | Facility: CLINIC | Age: 44
End: 2024-11-14

## 2024-11-14 RX ORDER — LOSARTAN POTASSIUM 50 MG/1
TAKE ONE-HALF TABLET BY MOUTH ONE TIME DAILY TABLET, FILM COATED ORAL
Qty: 30 UNSPECIFIED | Refills: 0 | Status: ACTIVE | COMMUNITY

## 2024-11-14 RX ORDER — AMLODIPINE AND VALSARTAN 10; 320 MG/1; MG/1
TAKE ONE TABLET BY MOUTH ONE TIME DAILY TABLET, FILM COATED ORAL
Qty: 90 UNSPECIFIED | Refills: 0 | Status: ACTIVE | COMMUNITY

## 2024-11-14 RX ORDER — FLUOXETINE HYDROCHLORIDE 10 MG/1
TAKE ONE CAPSULE BY MOUTH ONE TIME DAILY CAPSULE ORAL
Qty: 30 UNSPECIFIED | Refills: 0 | Status: ACTIVE | COMMUNITY

## 2024-11-14 RX ORDER — CITALOPRAM HYDROBROMIDE 10 MG/1
TAKE ONE TABLET BY MOUTH ONE TIME DAILY TABLET ORAL
Qty: 30 UNSPECIFIED | Refills: 0 | Status: ACTIVE | COMMUNITY

## 2024-11-14 RX ORDER — APIXABAN 5 MG (74)
TAKE AS DIRECTED ON THE PACKAGING KIT ORAL
Qty: 74 EACH | Refills: 0 | Status: ACTIVE | COMMUNITY

## 2024-11-14 RX ORDER — AMOXICILLIN 500 MG/1
TAKE ONE CAPSULE BY MOUTH THREE TIMES A DAY FOR 7 DAYS CAPSULE ORAL
Qty: 21 UNSPECIFIED | Refills: 0 | Status: ACTIVE | COMMUNITY

## 2024-11-14 RX ORDER — DICYCLOMINE HYDROCHLORIDE 20 MG/1
TAKE 1 TABLET BY MOUTH IN THE MORNING AND AT BEDTIME FOR 10 DAYS TABLET ORAL
Qty: 20 EACH | Refills: 0 | Status: ACTIVE | COMMUNITY

## 2024-11-14 RX ORDER — TEMAZEPAM 7.5 MG/1
TAKE ONE CAPSULE BY MOUTH ONE TIME DAILY CAPSULE ORAL
Qty: 30 UNSPECIFIED | Refills: 0 | Status: ACTIVE | COMMUNITY

## 2024-11-14 RX ORDER — CHLORHEXIDINE GLUCONATE 1.2 MG/ML
AFTER BRUSHING TEETH, SWISH 15 ML UNDILUTED FOR 30 SECONDS, THEN SPIT OUT THREE TIMES A DAY UNTIL GONE RINSE ORAL
Qty: 473 UNSPECIFIED | Refills: 0 | Status: ACTIVE | COMMUNITY

## 2024-11-14 RX ORDER — SUMATRIPTAN SUCCINATE 50 MG/1
TAKE ONE-HALF TABLET BY MOUTH AT ONSET OF HEADACHE. MAY REPEAT DOSE IN TWO HOURS IF NEEDED TABLET ORAL
Qty: 8 UNSPECIFIED | Refills: 0 | Status: ACTIVE | COMMUNITY

## 2024-11-14 RX ORDER — IBUPROFEN 800 MG/1
TAKE ONE TABLET BY MOUTH EVERY 6 HOURS AS NEEDED FOR PAIN TABLET, FILM COATED ORAL
Qty: 20 UNSPECIFIED | Refills: 0 | Status: ACTIVE | COMMUNITY

## 2024-11-14 RX ORDER — HYDROCODONE BITARTRATE AND ACETAMINOPHEN 5; 325 MG/1; MG/1
TAKE ONE TABLET BY MOUTH EVERY 6 HOURS AS NEEDED FOR PAIN TABLET ORAL
Qty: 12 UNSPECIFIED | Refills: 0 | Status: ACTIVE | COMMUNITY